# Patient Record
Sex: MALE | Race: WHITE | NOT HISPANIC OR LATINO | Employment: OTHER | URBAN - METROPOLITAN AREA
[De-identification: names, ages, dates, MRNs, and addresses within clinical notes are randomized per-mention and may not be internally consistent; named-entity substitution may affect disease eponyms.]

---

## 2023-02-01 PROBLEM — R05.3 CHRONIC COUGH: Status: ACTIVE | Noted: 2023-02-01

## 2023-02-01 PROBLEM — E78.5 HYPERLIPIDEMIA: Status: ACTIVE | Noted: 2023-02-01

## 2023-02-01 PROBLEM — R53.83 FATIGUE: Status: ACTIVE | Noted: 2023-02-01

## 2023-02-01 PROBLEM — D64.9 ANEMIA: Status: ACTIVE | Noted: 2023-02-01

## 2023-02-01 PROBLEM — E34.9 TESTOSTERONE DEFICIENCY: Status: ACTIVE | Noted: 2023-02-01

## 2023-02-01 PROBLEM — J32.9 SINUSITIS: Status: ACTIVE | Noted: 2023-02-01

## 2023-02-01 PROBLEM — J06.9 URI (UPPER RESPIRATORY INFECTION): Status: ACTIVE | Noted: 2023-02-01

## 2023-02-01 PROBLEM — R09.81 NASAL CONGESTION: Status: ACTIVE | Noted: 2023-02-01

## 2023-02-01 PROBLEM — J04.0 LARYNGITIS: Status: ACTIVE | Noted: 2023-02-01

## 2023-02-01 PROBLEM — J30.9 ALLERGIC RHINITIS: Status: ACTIVE | Noted: 2023-02-01

## 2023-02-01 PROBLEM — H69.90 ETD (EUSTACHIAN TUBE DYSFUNCTION): Status: ACTIVE | Noted: 2023-02-01

## 2023-02-01 PROBLEM — J40 BRONCHITIS: Status: ACTIVE | Noted: 2023-02-01

## 2023-02-01 PROBLEM — I10 HTN (HYPERTENSION): Status: ACTIVE | Noted: 2023-02-01

## 2023-02-01 PROBLEM — I25.10 CORONARY ARTERY DISEASE: Status: ACTIVE | Noted: 2023-02-01

## 2023-02-01 PROBLEM — R19.7 DIARRHEA: Status: ACTIVE | Noted: 2023-02-01

## 2023-02-01 PROBLEM — R03.0 ELEVATED BLOOD PRESSURE READING: Status: ACTIVE | Noted: 2023-02-01

## 2023-02-01 PROBLEM — J06.9 ACUTE URI: Status: ACTIVE | Noted: 2023-02-01

## 2023-02-01 PROBLEM — R05.8 ALLERGIC COUGH: Status: ACTIVE | Noted: 2023-02-01

## 2023-02-01 PROBLEM — E66.3 OVERWEIGHT (BMI 25.0-29.9): Status: ACTIVE | Noted: 2023-02-01

## 2023-02-01 PROBLEM — L91.8 CUTANEOUS SKIN TAGS: Status: ACTIVE | Noted: 2023-02-01

## 2023-02-01 PROBLEM — J40 TRACHEOBRONCHITIS: Status: ACTIVE | Noted: 2023-02-01

## 2023-02-01 PROBLEM — E83.51 HYPOCALCEMIA: Status: ACTIVE | Noted: 2023-02-01

## 2023-02-01 PROBLEM — H65.01 RIGHT ACUTE SEROUS OTITIS MEDIA: Status: ACTIVE | Noted: 2023-02-01

## 2023-02-01 PROBLEM — R79.89 LOW VITAMIN D LEVEL: Status: ACTIVE | Noted: 2023-02-01

## 2023-02-01 PROBLEM — B34.9 VIRAL SYNDROME: Status: ACTIVE | Noted: 2023-02-01

## 2023-02-01 PROBLEM — M62.838 MUSCLE SPASMS OF NECK: Status: ACTIVE | Noted: 2023-02-01

## 2023-02-01 PROBLEM — K51.90 ULCERATIVE COLITIS (MULTI): Status: ACTIVE | Noted: 2023-02-01

## 2023-02-01 PROBLEM — E78.5 DYSLIPIDEMIA: Status: ACTIVE | Noted: 2023-02-01

## 2023-02-01 PROBLEM — R07.89 CHEST TIGHTNESS: Status: ACTIVE | Noted: 2023-02-01

## 2023-02-01 PROBLEM — R25.2 CRAMP AND SPASM: Status: ACTIVE | Noted: 2023-02-01

## 2023-02-01 RX ORDER — TESTOSTERONE CYPIONATE 200 MG/ML
INJECTION, SOLUTION INTRAMUSCULAR
COMMUNITY
Start: 2021-07-20

## 2023-02-01 RX ORDER — VALSARTAN 160 MG/1
160 TABLET ORAL DAILY
COMMUNITY
End: 2023-11-21 | Stop reason: SDUPTHER

## 2023-02-01 RX ORDER — CHLORZOXAZONE 500 MG/1
1 TABLET ORAL 4 TIMES DAILY PRN
COMMUNITY
Start: 2017-07-10

## 2023-02-01 RX ORDER — ROSUVASTATIN CALCIUM 10 MG/1
1 TABLET, COATED ORAL DAILY
COMMUNITY
Start: 2018-12-31 | End: 2023-06-05

## 2023-03-15 ENCOUNTER — OFFICE VISIT (OUTPATIENT)
Dept: PRIMARY CARE | Facility: CLINIC | Age: 63
End: 2023-03-15
Payer: COMMERCIAL

## 2023-03-15 VITALS
WEIGHT: 227 LBS | DIASTOLIC BLOOD PRESSURE: 81 MMHG | HEIGHT: 70 IN | TEMPERATURE: 98.2 F | SYSTOLIC BLOOD PRESSURE: 128 MMHG | BODY MASS INDEX: 32.5 KG/M2 | OXYGEN SATURATION: 98 % | HEART RATE: 63 BPM

## 2023-03-15 DIAGNOSIS — M62.838 MUSCLE SPASMS OF NECK: ICD-10-CM

## 2023-03-15 DIAGNOSIS — K51.00 ULCERATIVE PANCOLITIS WITHOUT COMPLICATION (MULTI): ICD-10-CM

## 2023-03-15 DIAGNOSIS — R09.89 LABILE HYPERTENSION: Primary | ICD-10-CM

## 2023-03-15 DIAGNOSIS — Z51.81 MEDICATION MONITORING ENCOUNTER: ICD-10-CM

## 2023-03-15 DIAGNOSIS — E34.9 TESTOSTERONE DEFICIENCY: ICD-10-CM

## 2023-03-15 DIAGNOSIS — E78.5 DYSLIPIDEMIA: ICD-10-CM

## 2023-03-15 DIAGNOSIS — K52.9 COLITIS: ICD-10-CM

## 2023-03-15 DIAGNOSIS — I10 PRIMARY HYPERTENSION: ICD-10-CM

## 2023-03-15 PROCEDURE — 99213 OFFICE O/P EST LOW 20 MIN: CPT | Performed by: FAMILY MEDICINE

## 2023-03-15 PROCEDURE — 3074F SYST BP LT 130 MM HG: CPT | Performed by: FAMILY MEDICINE

## 2023-03-15 PROCEDURE — 3079F DIAST BP 80-89 MM HG: CPT | Performed by: FAMILY MEDICINE

## 2023-03-15 PROCEDURE — 1036F TOBACCO NON-USER: CPT | Performed by: FAMILY MEDICINE

## 2023-03-15 ASSESSMENT — PAIN SCALES - GENERAL: PAINLEVEL: 0-NO PAIN

## 2023-03-15 NOTE — PROGRESS NOTES
Subjective   Bradley Patterson is a 62 y.o. male who presents for Follow-up (BP check).  HPI      Objective     Physical Exam  Vitals and nursing note reviewed.   Constitutional:       Appearance: Normal appearance. He is obese.   HENT:      Head: Normocephalic.      Right Ear: Tympanic membrane and external ear normal.      Left Ear: Tympanic membrane and external ear normal.      Nose: Nose normal.      Mouth/Throat:      Mouth: Mucous membranes are moist.   Eyes:      Extraocular Movements: Extraocular movements intact.      Conjunctiva/sclera: Conjunctivae normal.      Pupils: Pupils are equal, round, and reactive to light.      Comments: Denies any visual disturbances.  Does get his eyes checked periodically.   Neck:      Comments:   Has occasional neck pain and spasm from an old injury.  Occasionally has to take muscle relaxants.  He does do stretching exercises to help improve the range of motion.  Cardiovascular:      Rate and Rhythm: Normal rate and regular rhythm.      Pulses: Normal pulses.      Heart sounds: Normal heart sounds. No murmur heard.     No friction rub.      Comments: Heart rhythm is stable S1 and S2 noted.  No ectopics no murmurs.  Pulmonary:      Effort: Pulmonary effort is normal.      Comments:  lungs are clear to auscultation.  Abdominal:      General: Bowel sounds are normal.      Palpations: Abdomen is soft.      Comments: History of ulcerative colitis and does follow with the [facility].  Occasionally gets cramping and some diarrhea but has been relatively stable.   Genitourinary:     Rectum: Guaiac result negative.   Musculoskeletal:         General: Normal range of motion.      Cervical back: Normal range of motion. Rigidity and tenderness present.      Comments: Patient does have some lower back spasm and DJD.  He does try to exercise and stretch.   Skin:     General: Skin is warm.   Neurological:      General: No focal deficit present.      Mental Status: He is alert and  oriented to person, place, and time. Mental status is at baseline.   Psychiatric:         Mood and Affect: Mood normal.         Behavior: Behavior normal.         Thought Content: Thought content normal.         Judgment: Judgment normal.      Comments: Normal mood and affect.     PLAN;   patient was evaluated for mainly his hypertension and review of medication.  He has no new blood pressure pill valsartan seems to be controlling his pressure very well when he came into the office he was 128/81 and when I rechecked it it was 128/78.  Patient is trying to watch his diet and avoid salt and lose a few pounds.  He also is trying to exercise.  He goes to the [facility] for evaluation of his colitis and that is currently stable. Patient also sees urology for his testosterone deficiency.  Patient is otherwise stable we will follow-up in 4 months or sooner as needed.             Immanuel Aleman, DO

## 2023-03-15 NOTE — PROGRESS NOTES
Subjective   Bradley Patterson is a 62 y.o. male who presents for Follow-up (BP check).  HPI patient is a 62-year-old male who is being reevaluated mainly for blood pressure.  Patient was started on a new blood pressure medicine about 2 months ago and it seems to be controlling his blood pressure much better.  He does try to monitor it at home and then he came in today for recheck.  He otherwise is very healthy and recently retired.  He does have some issues with ulcerative colitis and does go to the [facility] gastroenterology for that problem.      Objective ROS10 systems were reviewed and the information is included in the HPI and no additional review of systems is indicated.    Physical Exam    Problem List Items Addressed This Visit    None           Immanuel Aleman, DO

## 2023-06-05 DIAGNOSIS — E78.5 HYPERLIPIDEMIA, UNSPECIFIED: ICD-10-CM

## 2023-06-05 RX ORDER — ROSUVASTATIN CALCIUM 10 MG/1
TABLET, COATED ORAL
Qty: 90 TABLET | Refills: 4 | Status: SHIPPED | OUTPATIENT
Start: 2023-06-05 | End: 2024-04-22 | Stop reason: SDUPTHER

## 2023-10-03 ENCOUNTER — OFFICE VISIT (OUTPATIENT)
Dept: PRIMARY CARE | Facility: CLINIC | Age: 63
End: 2023-10-03
Payer: COMMERCIAL

## 2023-10-03 VITALS
DIASTOLIC BLOOD PRESSURE: 82 MMHG | SYSTOLIC BLOOD PRESSURE: 142 MMHG | HEIGHT: 70 IN | HEART RATE: 68 BPM | OXYGEN SATURATION: 97 % | WEIGHT: 220 LBS | TEMPERATURE: 98 F | BODY MASS INDEX: 31.5 KG/M2

## 2023-10-03 DIAGNOSIS — K21.00 GASTROESOPHAGEAL REFLUX DISEASE WITH ESOPHAGITIS WITHOUT HEMORRHAGE: ICD-10-CM

## 2023-10-03 DIAGNOSIS — R09.89 LABILE HYPERTENSION: ICD-10-CM

## 2023-10-03 DIAGNOSIS — Z00.00 PHYSICAL EXAM, ANNUAL: Primary | ICD-10-CM

## 2023-10-03 DIAGNOSIS — Z23 NEEDS FLU SHOT: ICD-10-CM

## 2023-10-03 DIAGNOSIS — R79.89 LOW VITAMIN D LEVEL: ICD-10-CM

## 2023-10-03 DIAGNOSIS — E78.5 DYSLIPIDEMIA: ICD-10-CM

## 2023-10-03 DIAGNOSIS — K52.9 COLITIS: ICD-10-CM

## 2023-10-03 DIAGNOSIS — K51.811 OTHER ULCERATIVE COLITIS WITH RECTAL BLEEDING (MULTI): ICD-10-CM

## 2023-10-03 LAB
ALBUMIN SERPL BCP-MCNC: 4.7 G/DL (ref 3.4–5)
ALP SERPL-CCNC: 49 U/L (ref 33–136)
ALT SERPL W P-5'-P-CCNC: 28 U/L (ref 10–52)
ANION GAP SERPL CALC-SCNC: 13 MMOL/L (ref 10–20)
APPEARANCE UR: CLEAR
AST SERPL W P-5'-P-CCNC: 20 U/L (ref 9–39)
BILIRUB SERPL-MCNC: 1.2 MG/DL (ref 0–1.2)
BILIRUB UR QL STRIP: NEGATIVE
BUN SERPL-MCNC: 15 MG/DL (ref 6–23)
CALCIUM SERPL-MCNC: 9.8 MG/DL (ref 8.6–10.6)
CHLORIDE SERPL-SCNC: 103 MMOL/L (ref 98–107)
CHOLEST SERPL-MCNC: 174 MG/DL (ref 0–199)
CHOLESTEROL/HDL RATIO: 3.2
CO2 SERPL-SCNC: 28 MMOL/L (ref 21–32)
COLOR UR: YELLOW
CREAT SERPL-MCNC: 0.83 MG/DL (ref 0.5–1.3)
ERYTHROCYTE [DISTWIDTH] IN BLOOD BY AUTOMATED COUNT: 13.7 % (ref 11.5–14.5)
GFR SERPL CREATININE-BSD FRML MDRD: >90 ML/MIN/1.73M*2
GLUCOSE SERPL-MCNC: 86 MG/DL (ref 74–99)
GLUCOSE UR STRIP-MCNC: NEGATIVE MG/DL
HCT VFR BLD AUTO: 44.8 % (ref 41–52)
HDLC SERPL-MCNC: 54.1 MG/DL
HGB BLD-MCNC: 15.2 G/DL (ref 13.5–17.5)
HGB UR QL STRIP: NEGATIVE
KETONES UR STRIP-MCNC: NEGATIVE MG/DL
LDLC SERPL CALC-MCNC: 94 MG/DL (ref 140–190)
LEUKOCYTE ESTERASE UR QL STRIP: NEGATIVE
MCH RBC QN AUTO: 32 PG (ref 26–34)
MCHC RBC AUTO-ENTMCNC: 33.9 G/DL (ref 32–36)
MCV RBC AUTO: 94 FL (ref 80–100)
NITRITE UR QL STRIP: NEGATIVE
NON HDL CHOLESTEROL: 120 MG/DL (ref 0–149)
NRBC BLD-RTO: 0 /100 WBCS (ref 0–0)
PH UR STRIP: 6 [PH]
PLATELET # BLD AUTO: 202 X10*3/UL (ref 150–450)
PMV BLD AUTO: 9.8 FL (ref 7.5–11.5)
POTASSIUM SERPL-SCNC: 4.4 MMOL/L (ref 3.5–5.3)
PROT SERPL-MCNC: 6.8 G/DL (ref 6.4–8.2)
PROT UR STRIP-MCNC: NEGATIVE MG/DL
RBC # BLD AUTO: 4.75 X10*6/UL (ref 4.5–5.9)
SODIUM SERPL-SCNC: 140 MMOL/L (ref 136–145)
SP GR UR STRIP.AUTO: 1.02
TRIGL SERPL-MCNC: 129 MG/DL (ref 0–149)
TSH SERPL-ACNC: 1.53 MIU/L (ref 0.44–3.98)
UROBILINOGEN UR STRIP-ACNC: 0.2 E.U./DL
VLDL: 26 MG/DL (ref 0–40)
WBC # BLD AUTO: 6.8 X10*3/UL (ref 4.4–11.3)

## 2023-10-03 PROCEDURE — 90471 IMMUNIZATION ADMIN: CPT | Performed by: FAMILY MEDICINE

## 2023-10-03 PROCEDURE — 3077F SYST BP >= 140 MM HG: CPT | Performed by: FAMILY MEDICINE

## 2023-10-03 PROCEDURE — 99396 PREV VISIT EST AGE 40-64: CPT | Performed by: FAMILY MEDICINE

## 2023-10-03 PROCEDURE — 93000 ELECTROCARDIOGRAM COMPLETE: CPT | Performed by: FAMILY MEDICINE

## 2023-10-03 PROCEDURE — 3079F DIAST BP 80-89 MM HG: CPT | Performed by: FAMILY MEDICINE

## 2023-10-03 PROCEDURE — 36415 COLL VENOUS BLD VENIPUNCTURE: CPT

## 2023-10-03 PROCEDURE — 81003 URINALYSIS AUTO W/O SCOPE: CPT | Performed by: FAMILY MEDICINE

## 2023-10-03 PROCEDURE — 90682 RIV4 VACC RECOMBINANT DNA IM: CPT | Performed by: FAMILY MEDICINE

## 2023-10-03 PROCEDURE — 1036F TOBACCO NON-USER: CPT | Performed by: FAMILY MEDICINE

## 2023-10-03 ASSESSMENT — ENCOUNTER SYMPTOMS: DEPRESSION: 0

## 2023-10-03 NOTE — PROGRESS NOTES
Subjective   Bradley Patterson is a 63 y.o. male who presents for Annual Exam (Pt is here for yearly physical).     HPI  : Patient is a 63-year-old male who is in today for his yearly physical.  Patient will have EKG, urinalysis and blood work.  He states he is feeling fine since he retired from work and tries to stay busy and active daily.  Patient also would like a flu shot today.  He states that he takes his blood pressure medication and his blood pressure has been stable, he also states his colitis has been stable.  Patient does still follow with urology for his testosterone deficiency.      Objective   : ROS :10 systems were reviewed and the information is included in the HPI and no additional review of systems is indicated.    Physical Exam  Vitals and nursing note reviewed.   Constitutional:       Appearance: Normal appearance.      Comments: Patient is alert and oriented x3.   No acute distress   HENT:      Head: Normocephalic.      Right Ear: Tympanic membrane and external ear normal.      Left Ear: Tympanic membrane and external ear normal.      Ears:      Comments: Ears are patent bilaterally and TMs are clear.     Nose: Nose normal.      Mouth/Throat:      Mouth: Mucous membranes are moist.      Pharynx: Oropharynx is clear.      Comments: Mouth is moist, tongue is midline.  No posterior pharyngeal erythema.  Eyes:      Extraocular Movements: Extraocular movements intact.      Conjunctiva/sclera: Conjunctivae normal.      Pupils: Pupils are equal, round, and reactive to light.      Comments: No visual disturbance, does have her eyes examined once a year.   Neck:      Comments: Occasionally has neck pain and spasm from an old injury.  Does have muscle relaxers at home if he needs them.  No carotid bruits, no thyromegaly, no cervical adenopathy.    Cardiovascular:      Rate and Rhythm: Normal rate and regular rhythm.      Pulses: Normal pulses.      Heart sounds: Normal heart sounds.      Comments: Patient  denies chest pain and no palpitations.  Heart rhythm is stable S1 and S2 are noted, no ectopics.  Pulmonary:      Effort: Pulmonary effort is normal.      Comments: Patient does get an intermittent cough which could be allergies.  He also has more sputum production and congestion in the morning.  Today his lungs are clear to auscultation.  Abdominal:      General: Bowel sounds are normal.      Palpations: Abdomen is soft.      Comments: Patient states his ulcerative colitis has been stable.  He does follow with gastroenterology at the [facility].  He does have frequent colonoscopies and he denies any abdominal pain at this time.  Positive bowel sounds x4.  Abdomen is soft and nontender to palpation.   Genitourinary:     Comments: Patient does follow with urology and does receive testosterone every 3 weeks.  Patient denies dysuria, no hematuria, no nocturia, denies flank pain.  Musculoskeletal:      Comments: Recently saw orthopedics and had a cortisone shot in his right shoulder and right knee.  Currently he is feeling better but does have age-related arthritis in the joints.  Mild restriction of motion cervical and lumbar spines due to   DJD , and muscle spasm.   Skin:     General: Skin is warm.      Comments: There is no bruising, no erythema, no skin lesions noted, no rashes.   Neurological:      General: No focal deficit present.      Mental Status: He is alert and oriented to person, place, and time. Mental status is at baseline.      Comments: No focal neurosensory deficits are noted.  Patient denies any peripheral neuropathy.  Coordination and gait are stable.  Normal muscle strength upper and lower extremities.   Psychiatric:         Mood and Affect: Mood normal.         Behavior: Behavior normal.         Thought Content: Thought content normal.         Judgment: Judgment normal.      Comments: Patient has normal mood and affect.  Thought content and judgment are stable.  No signs of vascular dementia.   Behavior is normal.     PLAN : Patient is a 63-year-old male who is in today for a physical exam.  Patient's urinalysis showed a pH of 6.0, specific gravity 1.020, negative leukocytes, negative protein, negative blood, negative glucose.  EKG shows sinus rhythm at a rate of 61, developed old septal infarct, ECG has no acute changes and is unchanged from last year.  Patient had his blood work drawn and will be notified of the results in 4 days, further recommendations will be made after review of his blood results.  He also received the Flublok , flu shot today without problems.   Patient's exam is stable and he will follow-up as needed.    Problem List Items Addressed This Visit    None  Visit Diagnoses       Needs flu shot    -  Primary    Physical exam, annual        Relevant Orders    CBC    Comprehensive Metabolic Panel    Lipid Panel    Thyroid Stimulating Hormone    ECG 12 Lead    POCT UA (Automated) docked device    Gastroesophageal reflux disease with esophagitis without hemorrhage                     Immanuel Aleman, DO

## 2023-10-07 NOTE — RESULT ENCOUNTER NOTE
Cholesterol is normal at 174       triglycerides are normal at 129      thyroid function is normal    red and white blood cell counts are normal     blood sugar,   kidney and liver function are normal     blood work all looks normal and stable         keep up the good work

## 2023-11-21 DIAGNOSIS — R09.89 LABILE HYPERTENSION: ICD-10-CM

## 2023-11-22 RX ORDER — VALSARTAN 160 MG/1
160 TABLET ORAL DAILY
Qty: 90 TABLET | Refills: 3 | Status: SHIPPED | OUTPATIENT
Start: 2023-11-22 | End: 2024-04-22 | Stop reason: SDUPTHER

## 2024-01-08 ENCOUNTER — OFFICE VISIT (OUTPATIENT)
Dept: PRIMARY CARE | Facility: CLINIC | Age: 64
End: 2024-01-08
Payer: COMMERCIAL

## 2024-01-08 VITALS
BODY MASS INDEX: 32.35 KG/M2 | OXYGEN SATURATION: 98 % | RESPIRATION RATE: 16 BRPM | WEIGHT: 226 LBS | HEART RATE: 65 BPM | HEIGHT: 70 IN | DIASTOLIC BLOOD PRESSURE: 84 MMHG | TEMPERATURE: 98.1 F | SYSTOLIC BLOOD PRESSURE: 140 MMHG

## 2024-01-08 DIAGNOSIS — R25.2 CRAMP AND SPASM: ICD-10-CM

## 2024-01-08 DIAGNOSIS — R09.89 LABILE HYPERTENSION: ICD-10-CM

## 2024-01-08 DIAGNOSIS — K90.9 DIARRHEA DUE TO MALABSORPTION (HHS-HCC): ICD-10-CM

## 2024-01-08 DIAGNOSIS — K51.80 OTHER ULCERATIVE COLITIS WITHOUT COMPLICATION (MULTI): Primary | ICD-10-CM

## 2024-01-08 DIAGNOSIS — R19.7 DIARRHEA DUE TO MALABSORPTION (HHS-HCC): ICD-10-CM

## 2024-01-08 DIAGNOSIS — R05.8 ALLERGIC COUGH: ICD-10-CM

## 2024-01-08 DIAGNOSIS — R05.3 CHRONIC COUGH: ICD-10-CM

## 2024-01-08 PROCEDURE — 3079F DIAST BP 80-89 MM HG: CPT | Performed by: FAMILY MEDICINE

## 2024-01-08 PROCEDURE — 3077F SYST BP >= 140 MM HG: CPT | Performed by: FAMILY MEDICINE

## 2024-01-08 PROCEDURE — 1036F TOBACCO NON-USER: CPT | Performed by: FAMILY MEDICINE

## 2024-01-08 PROCEDURE — 99214 OFFICE O/P EST MOD 30 MIN: CPT | Performed by: FAMILY MEDICINE

## 2024-01-08 RX ORDER — MESALAMINE 4 G/60 ML
4 KIT RECTAL DAILY
Qty: 7 KIT | Refills: 3 | Status: SHIPPED | OUTPATIENT
Start: 2024-01-08 | End: 2024-04-22 | Stop reason: WASHOUT

## 2024-01-08 RX ORDER — PREDNISONE 10 MG/1
10 TABLET ORAL 2 TIMES DAILY
Qty: 14 TABLET | Refills: 1 | Status: SHIPPED | OUTPATIENT
Start: 2024-01-08 | End: 2024-01-22

## 2024-01-08 RX ORDER — PREDNISONE 10 MG/1
10 TABLET ORAL 2 TIMES DAILY
Qty: 14 TABLET | Refills: 3 | Status: SHIPPED | OUTPATIENT
Start: 2024-01-08 | End: 2024-01-08 | Stop reason: ALTCHOICE

## 2024-01-08 ASSESSMENT — PATIENT HEALTH QUESTIONNAIRE - PHQ9
1. LITTLE INTEREST OR PLEASURE IN DOING THINGS: NOT AT ALL
SUM OF ALL RESPONSES TO PHQ9 QUESTIONS 1 AND 2: 0
1. LITTLE INTEREST OR PLEASURE IN DOING THINGS: NOT AT ALL
2. FEELING DOWN, DEPRESSED OR HOPELESS: NOT AT ALL
SUM OF ALL RESPONSES TO PHQ9 QUESTIONS 1 AND 2: 0
2. FEELING DOWN, DEPRESSED OR HOPELESS: NOT AT ALL

## 2024-01-08 ASSESSMENT — PAIN SCALES - GENERAL: PAINLEVEL: 0-NO PAIN

## 2024-01-08 NOTE — PROGRESS NOTES
Subjective   Bradley Patterson is a 63 y.o. male who presents for Sick Visit (Patient here with complaint of cough).    HPI  : Patient is a 63-year-old male who has a dry type allergic cough.  He states that it has been lingering through the holidays and his ulcerative colitis has also been giving him some problems.  He takes medicine from gastroenterology for ulcerative colitis and he also has some Rowasa enemas that he uses.  Patient will be evaluated today and he may need a short course of prednisone for his allergic type cough and also his flareup of ulcerative colitis.      Objective  : ROS : 10 systems were reviewed and the information is included in the HPI and no additional review of systems is indicated.    Physical Exam  Vitals and nursing note reviewed.   Constitutional:       Appearance: Normal appearance.      Comments: Patient is alert and oriented x3.   No acute distress   HENT:      Head: Normocephalic.      Right Ear: Tympanic membrane and external ear normal.      Left Ear: Tympanic membrane and external ear normal.      Ears:      Comments: Ears are patent bilaterally and TMs are clear.     Nose: Nose normal.      Mouth/Throat:      Mouth: Mucous membranes are moist.      Pharynx: Oropharynx is clear.      Comments: Mouth is moist, tongue is midline.  No posterior pharyngeal erythema.  Eyes:      Extraocular Movements: Extraocular movements intact.      Conjunctiva/sclera: Conjunctivae normal.      Pupils: Pupils are equal, round, and reactive to light.      Comments: No visual disturbance, does have his  eyes examined once a year.   Neck:      Comments: No carotid bruits, no thyromegaly, no cervical adenopathy.  Occasional neck spasm and restriction of motion secondary to stress and tension.  Cardiovascular:      Rate and Rhythm: Normal rate and regular rhythm.      Pulses: Normal pulses.      Heart sounds: Normal heart sounds.      Comments: Patient denies chest pain and no palpitations.  Heart  rhythm is stable S1 and S2 are noted, no ectopics.  Pulmonary:      Effort: Pulmonary effort is normal.      Breath sounds: Rhonchi present.      Comments: Patient develops an occasional cough with upper respiratory congestion.  He also thinks it may be an allergic type cough from environmental allergens.  No wheezing is noted just a few scattered rhonchi.  Abdominal:      General: Bowel sounds are normal.      Palpations: Abdomen is soft.      Comments: Patient has some localizable lower abdominal tenderness from his ulcerative colitis and does have some episodes of diarrhea which seem to be improving using the Rowasa enema.  Patient denies any bleeding at this time. It Is more of just lower abdominal cramping.   Genitourinary:     Comments: Patient denies dysuria, no hematuria, denies flank pain.  Occasional nocturia.  Musculoskeletal:         General: Normal range of motion.      Cervical back: Normal range of motion.      Comments: Age-related arthritis in the joints.  Mild restriction of motion cervical and lumbar spines due to mild arthritis and muscle spasm.   Skin:     General: Skin is warm and dry.      Comments: There is no bruising, no erythema, no skin lesions noted, no rashes.   Neurological:      General: No focal deficit present.      Mental Status: He is alert and oriented to person, place, and time. Mental status is at baseline.      Comments: No focal neurosensory deficits are noted.  Patient denies any peripheral neuropathy.  Coordination and gait are stable.  Normal muscle strength upper and lower extremities.   Psychiatric:         Mood and Affect: Mood normal.         Behavior: Behavior normal.         Thought Content: Thought content normal.         Judgment: Judgment normal.      Comments: Patient has normal mood and affect.  Thought content and judgment are stable.  No signs of vascular dementia.  Behavior is normal.     PLAN : Patient is a 63-year-old male who has history of ulcerative  colitis and recently had a flareup over the holidays.  He is starting to feel better and use the Rowasa enemas along with his oral medication since that does seem to help.  Patient was also given a short course of prednisone which will also help his cough.  He does not need any antibiotics does not care to take antibiotics since it makes his diarrhea worse.  He also will try some probiotics and watch his diet closely.  He will follow-up for blood work if there is no improvement.    Problem List Items Addressed This Visit       Allergic cough    Chronic cough    Cramp and spasm - Primary    Diarrhea    Labile hypertension    Ulcerative colitis (CMS/HCC)    Relevant Medications    mesalamine with cleansing wipe (Rowasa) 4 gram/60 mL enema    predniSONE (Deltasone) 10 mg tablet            Immanuel Aleman,

## 2024-04-10 PROBLEM — I10 HTN (HYPERTENSION): Status: ACTIVE | Noted: 2024-04-10

## 2024-04-18 ENCOUNTER — OFFICE VISIT (OUTPATIENT)
Dept: PRIMARY CARE | Facility: CLINIC | Age: 64
End: 2024-04-18
Payer: COMMERCIAL

## 2024-04-18 VITALS
WEIGHT: 226 LBS | SYSTOLIC BLOOD PRESSURE: 132 MMHG | HEIGHT: 70 IN | BODY MASS INDEX: 32.35 KG/M2 | DIASTOLIC BLOOD PRESSURE: 80 MMHG | OXYGEN SATURATION: 97 % | HEART RATE: 77 BPM

## 2024-04-18 DIAGNOSIS — Z12.5 SCREENING PSA (PROSTATE SPECIFIC ANTIGEN): ICD-10-CM

## 2024-04-18 DIAGNOSIS — I10 PRIMARY HYPERTENSION: ICD-10-CM

## 2024-04-18 DIAGNOSIS — M62.838 MUSCLE SPASMS OF NECK: ICD-10-CM

## 2024-04-18 DIAGNOSIS — H69.91 DYSFUNCTION OF RIGHT EUSTACHIAN TUBE: ICD-10-CM

## 2024-04-18 DIAGNOSIS — E78.5 DYSLIPIDEMIA: ICD-10-CM

## 2024-04-18 DIAGNOSIS — K52.9 COLITIS: ICD-10-CM

## 2024-04-18 DIAGNOSIS — R53.81 MALAISE AND FATIGUE: ICD-10-CM

## 2024-04-18 DIAGNOSIS — R42 DIZZINESS: Primary | ICD-10-CM

## 2024-04-18 DIAGNOSIS — R09.89 LABILE HYPERTENSION: ICD-10-CM

## 2024-04-18 DIAGNOSIS — R79.89 LOW VITAMIN D LEVEL: ICD-10-CM

## 2024-04-18 DIAGNOSIS — K51.811 OTHER ULCERATIVE COLITIS WITH RECTAL BLEEDING (MULTI): ICD-10-CM

## 2024-04-18 DIAGNOSIS — J30.89 NON-SEASONAL ALLERGIC RHINITIS, UNSPECIFIED TRIGGER: ICD-10-CM

## 2024-04-18 DIAGNOSIS — M77.8 SHOULDER CAPSULITIS, RIGHT: ICD-10-CM

## 2024-04-18 DIAGNOSIS — R53.83 MALAISE AND FATIGUE: ICD-10-CM

## 2024-04-18 LAB
25(OH)D3 SERPL-MCNC: 47 NG/ML (ref 30–100)
ALBUMIN SERPL BCP-MCNC: 4.1 G/DL (ref 3.4–5)
ALP SERPL-CCNC: 45 U/L (ref 33–136)
ALT SERPL W P-5'-P-CCNC: 21 U/L (ref 10–52)
ANION GAP SERPL CALC-SCNC: 15 MMOL/L (ref 10–20)
AST SERPL W P-5'-P-CCNC: 31 U/L (ref 9–39)
BILIRUB SERPL-MCNC: 0.6 MG/DL (ref 0–1.2)
BUN SERPL-MCNC: 15 MG/DL (ref 6–23)
CALCIUM SERPL-MCNC: 8.5 MG/DL (ref 8.6–10.6)
CHLORIDE SERPL-SCNC: 104 MMOL/L (ref 98–107)
CHOLEST SERPL-MCNC: 135 MG/DL (ref 0–199)
CHOLESTEROL/HDL RATIO: 2.9
CO2 SERPL-SCNC: 23 MMOL/L (ref 21–32)
CREAT SERPL-MCNC: 0.86 MG/DL (ref 0.5–1.3)
EGFRCR SERPLBLD CKD-EPI 2021: >90 ML/MIN/1.73M*2
ERYTHROCYTE [DISTWIDTH] IN BLOOD BY AUTOMATED COUNT: 12.6 % (ref 11.5–14.5)
GLUCOSE SERPL-MCNC: 64 MG/DL (ref 74–99)
HCT VFR BLD AUTO: 32.2 % (ref 41–52)
HDLC SERPL-MCNC: 45.9 MG/DL
HGB BLD-MCNC: 10.7 G/DL (ref 13.5–17.5)
LDLC SERPL CALC-MCNC: 75 MG/DL
MCH RBC QN AUTO: 29.9 PG (ref 26–34)
MCHC RBC AUTO-ENTMCNC: 33.2 G/DL (ref 32–36)
MCV RBC AUTO: 90 FL (ref 80–100)
NON HDL CHOLESTEROL: 89 MG/DL (ref 0–149)
NRBC BLD-RTO: 0 /100 WBCS (ref 0–0)
PLATELET # BLD AUTO: 224 X10*3/UL (ref 150–450)
POTASSIUM SERPL-SCNC: 4.1 MMOL/L (ref 3.5–5.3)
PROT SERPL-MCNC: 5.8 G/DL (ref 6.4–8.2)
PSA SERPL-MCNC: 0.54 NG/ML
RBC # BLD AUTO: 3.58 X10*6/UL (ref 4.5–5.9)
SODIUM SERPL-SCNC: 138 MMOL/L (ref 136–145)
TRIGL SERPL-MCNC: 71 MG/DL (ref 0–149)
TSH SERPL-ACNC: 1 MIU/L (ref 0.44–3.98)
VLDL: 14 MG/DL (ref 0–40)
WBC # BLD AUTO: 5 X10*3/UL (ref 4.4–11.3)

## 2024-04-18 PROCEDURE — 80061 LIPID PANEL: CPT

## 2024-04-18 PROCEDURE — 1036F TOBACCO NON-USER: CPT | Performed by: FAMILY MEDICINE

## 2024-04-18 PROCEDURE — 85027 COMPLETE CBC AUTOMATED: CPT

## 2024-04-18 PROCEDURE — 36415 COLL VENOUS BLD VENIPUNCTURE: CPT

## 2024-04-18 PROCEDURE — 3075F SYST BP GE 130 - 139MM HG: CPT | Performed by: FAMILY MEDICINE

## 2024-04-18 PROCEDURE — 3079F DIAST BP 80-89 MM HG: CPT | Performed by: FAMILY MEDICINE

## 2024-04-18 PROCEDURE — 84153 ASSAY OF PSA TOTAL: CPT

## 2024-04-18 PROCEDURE — 84443 ASSAY THYROID STIM HORMONE: CPT

## 2024-04-18 PROCEDURE — 82306 VITAMIN D 25 HYDROXY: CPT

## 2024-04-18 PROCEDURE — 80053 COMPREHEN METABOLIC PANEL: CPT

## 2024-04-18 PROCEDURE — 99214 OFFICE O/P EST MOD 30 MIN: CPT | Performed by: FAMILY MEDICINE

## 2024-04-18 ASSESSMENT — PATIENT HEALTH QUESTIONNAIRE - PHQ9
2. FEELING DOWN, DEPRESSED OR HOPELESS: NOT AT ALL
SUM OF ALL RESPONSES TO PHQ9 QUESTIONS 1 AND 2: 0
1. LITTLE INTEREST OR PLEASURE IN DOING THINGS: NOT AT ALL
2. FEELING DOWN, DEPRESSED OR HOPELESS: NOT AT ALL
SUM OF ALL RESPONSES TO PHQ9 QUESTIONS 1 AND 2: 0
1. LITTLE INTEREST OR PLEASURE IN DOING THINGS: NOT AT ALL

## 2024-04-18 NOTE — PROGRESS NOTES
Subjective   Bradley Patterson is a 63 y.o. male who presents for Earache (Last few weeks ears have been full/plugged up /He has also been slightly lightheaded and dizzy /).   HPI : Patient is a 63-year-old male who has been feeling some lightheadedness and dizziness along with some ear pressure and sinus congestion.  He would like his ears checked to make sure there is no infection.  He also needs his blood work rechecked today since he has a history of ulcerative colitis and does take medication from gastroenterology.  He also is on rosuvastatin and we will be checking his lipids.  Today his blood pressure is stable and he is on medication for hypertension.    Objective   :  ROS :10 systems were reviewed and the information is included in the HPI and no additional review of systems is indicated.    Physical Exam  Vitals and nursing note reviewed.   Constitutional:       Appearance: Normal appearance.      Comments: Patient is alert and oriented x3.   No acute distress   HENT:      Head: Normocephalic.      Right Ear: Tympanic membrane and external ear normal.      Left Ear: Tympanic membrane and external ear normal.      Ears:      Comments: Patient is concerned about ear infection and pressure in his ears causing him dizziness.  The canals are clear and the TMs are slightly retracted with some air-fluid levels.  No otitis media is noted.  He does possibly have some eustachian tube dysfunction.  Patient will try some Tylenol cold and sinus for relief.  There is no cerumen impaction.  He also can take some over-the-counter allergy pills like we discussed.     Nose: Rhinorrhea present.      Comments: Patient does have seasonal rhinorrhea with the weather change.  He occasionally takes Mucinex or over-the-counter allergy pills.  He will try some Tylenol cold and sinus.     Mouth/Throat:      Mouth: Mucous membranes are moist.      Pharynx: Oropharynx is clear.      Comments: Mouth is moist, tongue is midline.  No  posterior pharyngeal erythema.  Eyes:      Extraocular Movements: Extraocular movements intact.      Conjunctiva/sclera: Conjunctivae normal.      Pupils: Pupils are equal, round, and reactive to light.      Comments: No visual disturbance, does have his  eyes examined once a year.   Neck:      Comments: Restricted range of motion cervical spine due to degenerative arthritis and secondary spasm.  He did previously have an old neck and right shoulder injury.  No carotid bruits, no thyromegaly, no cervical adenopathy.  Occasional neck spasm and restriction of motion secondary to mild arthritis, stress and tension.  Cardiovascular:      Rate and Rhythm: Normal rate and regular rhythm.      Pulses: Normal pulses.      Heart sounds: Normal heart sounds.      Comments: Patient denies chest pain and no palpitations.  Heart rhythm is stable S1 and S2 are noted, no ectopics.  Pulmonary:      Effort: Pulmonary effort is normal.      Breath sounds: Normal breath sounds.      Comments: Patient denies any coughing or wheezing.  Lungs are clear to auscultation.    Abdominal:      General: Bowel sounds are normal.      Palpations: Abdomen is soft.      Comments: Ulcerative colitis and had rectal bleeding.  Patient does follow with gastroenterology for ulcerative colitis.  He also has some Rowasa enemas that he uses and he does take probiotics.  Abdomen is currently nontender and I will check his CBC today.  He plans on calling his gastroenterologist if he does not improve.   Genitourinary:     Comments: Follows with Urology for low testosterone.  Musculoskeletal:         General: Tenderness (Left knee tenderness and will do quadricep strengthening.) present. Normal range of motion.      Cervical back: Normal range of motion. Tenderness present.      Comments: Patient does have some DJD and pain in his left knee.  He will try to do some quadricep strengthening and straight leg raises.  Overall he is quite healthy and does go to the  gym to workout several times per week.  Age-related arthritis in the joints but otherwise stable.   Skin:     General: Skin is warm.      Comments: There is no bruising, no erythema, no skin lesions noted, no rashes.   Neurological:      General: No focal deficit present.      Mental Status: He is alert and oriented to person, place, and time. Mental status is at baseline.      Comments: No focal neurosensory deficits are noted.  Patient denies any peripheral neuropathy.  Coordination and gait are stable.  Normal muscle strength upper and lower extremities.   Psychiatric:         Mood and Affect: Mood normal.         Behavior: Behavior normal.         Thought Content: Thought content normal.         Judgment: Judgment normal.      Comments: Patient has normal mood and affect.  He does have some concerns about his ulcerative colitis and does not like when it flares up periodically.  Thought content and judgment are stable.  No signs of vascular dementia.  Behavior is normal.     PLAN : Patient is a relatively healthy 63-year-old male who has a history of ulcerative colitis and has been having some dizzy spells and ear congestion.  I do think it is from weather change and he will try some Tylenol cold and sinus.  He can also take some over-the-counter allergy pills which should help.  There is no infection in the inner ears so he does not need antibiotics.  I am also checking some blood work to make sure his hemoglobin and white blood cell count are stable.  He also will be notified of his lipids and blood counts in a few days.  He otherwise is stable and will follow-up as needed pending the blood work results.    Problem List Items Addressed This Visit       Dyslipidemia    Relevant Orders    CBC    Comprehensive Metabolic Panel    Lipid Panel    Thyroid Stimulating Hormone    Labile hypertension - Primary    Low vitamin D level    Relevant Orders    Vitamin D 25-Hydroxy,Total (for eval of Vitamin D levels)    HTN  (hypertension)    Relevant Orders    CBC    Comprehensive Metabolic Panel    Lipid Panel    Thyroid Stimulating Hormone     Other Visit Diagnoses       Screening PSA (prostate specific antigen)        Relevant Orders    Prostate Specific Antigen, Screen    Malaise and fatigue        Relevant Orders    CBC    Comprehensive Metabolic Panel    Lipid Panel    Thyroid Stimulating Hormone                 Immanuel Aleman DO

## 2024-04-19 DIAGNOSIS — K51.811 OTHER ULCERATIVE COLITIS WITH RECTAL BLEEDING (MULTI): Primary | ICD-10-CM

## 2024-04-19 RX ORDER — PREDNISONE 10 MG/1
TABLET ORAL
Qty: 28 TABLET | Refills: 0 | Status: SHIPPED | OUTPATIENT
Start: 2024-04-19 | End: 2024-04-29 | Stop reason: ALTCHOICE

## 2024-04-19 NOTE — RESULT ENCOUNTER NOTE
I spoke with patient so there is no need to call him.                                        Blood sugar was a little low at 64     probably from fasting    kidney and liver function were stable    calcium is a little bit low and he could chew some Tums  daily  and  that will help raise the calcium     protein was a little bit low at 5.8    he can drink a protein milkshake to help   or eat more protein in his diet    white blood cell count is normal      he is more anemic at 10.7 and 6 months ago   he was at   15.2     he should take a vitamin with iron   thyroid function is normal prostate level is normal     vitamin D is normal at 47          cholesterol was good at 135    triglycerides are normal at 71      I did speak with the patient concerning these results.

## 2024-04-20 NOTE — PROGRESS NOTES
"Subjective   Bradley Patterson is a 63 y.o. male.    Chief Complaint:  Follow-up coronary artery disease.    HPI    He has had some noncardiac issues including colitis.  This is resulted in an anemia.  He carries a diagnosis of ulcerative colitis.  Has been followed by a gastroenterologist.  Recently started on some steroid therapy.  From a cardiac standpoint he has done well and has remained asymptomatic.    His diagnosis of coronary artery disease is based on a positive calcium score of 127 which is consistent with the presence of mild to moderate atherosclerotic coronary artery disease. This was done in 2020.     Other medical problems including history of hyperlipidemia. He has a mildly positive family history of heart disease. He has smoked cigars in the past.     Allergies  Medication    · No Known Drug Allergies   Recorded By: Kristi Zuniga; 11/6/2014 11:20:12 AM     Family History  Father    · Family history of hypertension (V17.49) (Z82.49)  Family History    · Family history of cardiac disorder (V17.49) (Z82.49)  Unknown    · Family history of Stomach problems     Social History  Problems    · Does not use illicit drugs (V49.89) (Z78.9)   · Exercises regularly   · Never a smoker   · Occasional alcohol use    Review of Systems   Cardiovascular:  Positive for dyspnea on exertion.   Gastrointestinal:  Positive for abdominal pain.   All other systems reviewed and are negative.      Visit Vitals  /80 (BP Location: Right arm)   Pulse 82   Ht 1.778 m (5' 10\")   Wt 103 kg (226 lb)   SpO2 99%   BMI 32.43 kg/m²   Smoking Status Never   BSA 2.26 m²        Objective     Constitutional:       Appearance: Not in distress.   Neck:      Vascular: JVD normal.   Pulmonary:      Breath sounds: Normal breath sounds.   Cardiovascular:      Normal rate. Regular rhythm. S1 with normal intensity. S2 with normal intensity.       Murmurs: There is no murmur.      No gallop.    Pulses:     Intact distal pulses.   Edema:     " Peripheral edema absent.   Abdominal:      General: Bowel sounds are normal.   Neurological:      Mental Status: Alert and oriented to person, place and time.         Lab Review:   Lab Results   Component Value Date     04/18/2024    K 4.1 04/18/2024     04/18/2024    CO2 23 04/18/2024    BUN 15 04/18/2024    CREATININE 0.86 04/18/2024    GLUCOSE 64 (L) 04/18/2024    CALCIUM 8.5 (L) 04/18/2024       Assessment:    1.  Coronary disease.  Mild by prior CT evaluation.  No anginal symptoms.  Medical management for this patient.    2.  Hypertension.  Blood pressures are normal today.    3.  Hyperlipidemia.  Most recent LDL is 75.    Patient had a prior stress echo study done 2 years ago which showed excellent exercise duration.  He had no ST segment shifts.  Medical studies were normal.

## 2024-04-22 ENCOUNTER — OFFICE VISIT (OUTPATIENT)
Dept: CARDIOLOGY | Facility: CLINIC | Age: 64
End: 2024-04-22
Payer: COMMERCIAL

## 2024-04-22 VITALS
BODY MASS INDEX: 32.35 KG/M2 | OXYGEN SATURATION: 99 % | SYSTOLIC BLOOD PRESSURE: 130 MMHG | DIASTOLIC BLOOD PRESSURE: 80 MMHG | HEART RATE: 82 BPM | WEIGHT: 226 LBS | HEIGHT: 70 IN

## 2024-04-22 DIAGNOSIS — E78.49 OTHER HYPERLIPIDEMIA: ICD-10-CM

## 2024-04-22 DIAGNOSIS — I25.10 CORONARY ARTERY DISEASE INVOLVING NATIVE CORONARY ARTERY OF NATIVE HEART WITHOUT ANGINA PECTORIS: Primary | ICD-10-CM

## 2024-04-22 DIAGNOSIS — I10 PRIMARY HYPERTENSION: ICD-10-CM

## 2024-04-22 DIAGNOSIS — E78.5 HYPERLIPIDEMIA, UNSPECIFIED: ICD-10-CM

## 2024-04-22 DIAGNOSIS — R09.89 LABILE HYPERTENSION: ICD-10-CM

## 2024-04-22 PROBLEM — R03.0 ELEVATED BLOOD PRESSURE READING: Status: RESOLVED | Noted: 2023-02-01 | Resolved: 2024-04-22

## 2024-04-22 PROBLEM — R07.89 CHEST TIGHTNESS: Status: RESOLVED | Noted: 2023-02-01 | Resolved: 2024-04-22

## 2024-04-22 PROCEDURE — 1036F TOBACCO NON-USER: CPT | Performed by: INTERNAL MEDICINE

## 2024-04-22 PROCEDURE — 99213 OFFICE O/P EST LOW 20 MIN: CPT | Performed by: INTERNAL MEDICINE

## 2024-04-22 PROCEDURE — 3079F DIAST BP 80-89 MM HG: CPT | Performed by: INTERNAL MEDICINE

## 2024-04-22 PROCEDURE — 3075F SYST BP GE 130 - 139MM HG: CPT | Performed by: INTERNAL MEDICINE

## 2024-04-22 ASSESSMENT — ENCOUNTER SYMPTOMS
ABDOMINAL PAIN: 1
DYSPNEA ON EXERTION: 1

## 2024-04-23 RX ORDER — VALSARTAN 160 MG/1
160 TABLET ORAL DAILY
Qty: 90 TABLET | Refills: 3 | Status: SHIPPED | OUTPATIENT
Start: 2024-04-23 | End: 2025-04-23

## 2024-04-23 RX ORDER — ROSUVASTATIN CALCIUM 10 MG/1
10 TABLET, COATED ORAL DAILY
Qty: 90 TABLET | Refills: 3 | Status: SHIPPED | OUTPATIENT
Start: 2024-04-23

## 2024-04-29 DIAGNOSIS — K51.811 OTHER ULCERATIVE COLITIS WITH RECTAL BLEEDING (MULTI): Primary | ICD-10-CM

## 2024-04-29 RX ORDER — PREDNISONE 10 MG/1
TABLET ORAL
Qty: 42 TABLET | Refills: 0 | Status: SHIPPED | OUTPATIENT
Start: 2024-04-29

## 2024-11-13 ENCOUNTER — APPOINTMENT (OUTPATIENT)
Dept: PRIMARY CARE | Facility: CLINIC | Age: 64
End: 2024-11-13
Payer: COMMERCIAL

## 2024-11-13 VITALS
RESPIRATION RATE: 16 BRPM | HEART RATE: 84 BPM | TEMPERATURE: 98.2 F | WEIGHT: 225 LBS | BODY MASS INDEX: 44.17 KG/M2 | OXYGEN SATURATION: 96 % | HEIGHT: 60 IN | SYSTOLIC BLOOD PRESSURE: 112 MMHG | DIASTOLIC BLOOD PRESSURE: 72 MMHG

## 2024-11-13 DIAGNOSIS — R25.2 CRAMP AND SPASM: ICD-10-CM

## 2024-11-13 DIAGNOSIS — R53.83 MALAISE AND FATIGUE: ICD-10-CM

## 2024-11-13 DIAGNOSIS — K52.9 COLITIS: ICD-10-CM

## 2024-11-13 DIAGNOSIS — R53.81 MALAISE AND FATIGUE: ICD-10-CM

## 2024-11-13 DIAGNOSIS — E78.49 OTHER HYPERLIPIDEMIA: ICD-10-CM

## 2024-11-13 DIAGNOSIS — K21.00 GASTROESOPHAGEAL REFLUX DISEASE WITH ESOPHAGITIS WITHOUT HEMORRHAGE: ICD-10-CM

## 2024-11-13 DIAGNOSIS — D50.9 IRON DEFICIENCY ANEMIA, UNSPECIFIED IRON DEFICIENCY ANEMIA TYPE: ICD-10-CM

## 2024-11-13 DIAGNOSIS — I10 PRIMARY HYPERTENSION: ICD-10-CM

## 2024-11-13 DIAGNOSIS — R09.89 LABILE HYPERTENSION: Primary | ICD-10-CM

## 2024-11-13 LAB
ALBUMIN SERPL BCP-MCNC: 4.6 G/DL (ref 3.4–5)
ALP SERPL-CCNC: 52 U/L (ref 33–136)
ALT SERPL W P-5'-P-CCNC: 21 U/L (ref 10–52)
ANION GAP SERPL CALC-SCNC: 14 MMOL/L (ref 10–20)
AST SERPL W P-5'-P-CCNC: 27 U/L (ref 9–39)
BILIRUB SERPL-MCNC: 0.8 MG/DL (ref 0–1.2)
BUN SERPL-MCNC: 17 MG/DL (ref 6–23)
CALCIUM SERPL-MCNC: 9.6 MG/DL (ref 8.6–10.6)
CHLORIDE SERPL-SCNC: 102 MMOL/L (ref 98–107)
CHOLEST SERPL-MCNC: 189 MG/DL (ref 0–199)
CHOLESTEROL/HDL RATIO: 3.6
CO2 SERPL-SCNC: 29 MMOL/L (ref 21–32)
CREAT SERPL-MCNC: 0.88 MG/DL (ref 0.5–1.3)
EGFRCR SERPLBLD CKD-EPI 2021: >90 ML/MIN/1.73M*2
ERYTHROCYTE [DISTWIDTH] IN BLOOD BY AUTOMATED COUNT: 13 % (ref 11.5–14.5)
GLUCOSE SERPL-MCNC: 83 MG/DL (ref 74–99)
HCT VFR BLD AUTO: 43 % (ref 41–52)
HDLC SERPL-MCNC: 52.3 MG/DL
HGB BLD-MCNC: 14.5 G/DL (ref 13.5–17.5)
LDLC SERPL CALC-MCNC: 113 MG/DL
MCH RBC QN AUTO: 29.5 PG (ref 26–34)
MCHC RBC AUTO-ENTMCNC: 33.7 G/DL (ref 32–36)
MCV RBC AUTO: 87 FL (ref 80–100)
NON HDL CHOLESTEROL: 137 MG/DL (ref 0–149)
NRBC BLD-RTO: 0 /100 WBCS (ref 0–0)
PLATELET # BLD AUTO: 237 X10*3/UL (ref 150–450)
POTASSIUM SERPL-SCNC: 4.7 MMOL/L (ref 3.5–5.3)
PROT SERPL-MCNC: 6.6 G/DL (ref 6.4–8.2)
RBC # BLD AUTO: 4.92 X10*6/UL (ref 4.5–5.9)
SODIUM SERPL-SCNC: 140 MMOL/L (ref 136–145)
TRIGL SERPL-MCNC: 119 MG/DL (ref 0–149)
TSH SERPL-ACNC: 0.9 MIU/L (ref 0.44–3.98)
VLDL: 24 MG/DL (ref 0–40)
WBC # BLD AUTO: 6.4 X10*3/UL (ref 4.4–11.3)

## 2024-11-13 PROCEDURE — 3078F DIAST BP <80 MM HG: CPT | Performed by: FAMILY MEDICINE

## 2024-11-13 PROCEDURE — 85027 COMPLETE CBC AUTOMATED: CPT

## 2024-11-13 PROCEDURE — 84443 ASSAY THYROID STIM HORMONE: CPT

## 2024-11-13 PROCEDURE — 80061 LIPID PANEL: CPT

## 2024-11-13 PROCEDURE — 3074F SYST BP LT 130 MM HG: CPT | Performed by: FAMILY MEDICINE

## 2024-11-13 PROCEDURE — 80053 COMPREHEN METABOLIC PANEL: CPT

## 2024-11-13 PROCEDURE — 99214 OFFICE O/P EST MOD 30 MIN: CPT | Performed by: FAMILY MEDICINE

## 2024-11-13 PROCEDURE — 1036F TOBACCO NON-USER: CPT | Performed by: FAMILY MEDICINE

## 2024-11-13 PROCEDURE — 3008F BODY MASS INDEX DOCD: CPT | Performed by: FAMILY MEDICINE

## 2024-11-13 ASSESSMENT — PATIENT HEALTH QUESTIONNAIRE - PHQ9
SUM OF ALL RESPONSES TO PHQ9 QUESTIONS 1 AND 2: 0
2. FEELING DOWN, DEPRESSED OR HOPELESS: NOT AT ALL
1. LITTLE INTEREST OR PLEASURE IN DOING THINGS: NOT AT ALL

## 2024-11-13 ASSESSMENT — PAIN SCALES - GENERAL: PAINLEVEL_OUTOF10: 0-NO PAIN

## 2024-11-13 NOTE — PROGRESS NOTES
"Suzanne Patterson \"Robbin\" is a 64 y.o. male who presents for Follow-up (Follow up visit, patient complains of lightheadedness, fatigue, and congestion).    HPI  : Patient is a 64-year-old male who is in for a follow-up visit and recheck on blood pressure, blood work and review of medication.  He does follow with a gastroenterologist at the [facility] for his colitis and he had been put on budesonide 3 mg twice a day for his active colitis.  Everything has seemed to improve and he has stopped the medication at this time.  Patient does also follow with urology and has been getting testosterone injections usually on a monthly basis.  He is also still taking his blood pressure medication and his statin for dyslipidemia.    Objective  ; ROS  : 10 systems were reviewed and the information is included in the HPI and no additional review of systems is indicated.    Physical Exam  Vitals and nursing note reviewed.   Constitutional:       Appearance: Normal appearance. He is obese.      Comments: Patient is alert and oriented x3.   No acute distress and trying to watch his diet and lose some weight.   HENT:      Head: Normocephalic.      Right Ear: Tympanic membrane and external ear normal.      Left Ear: Tympanic membrane and external ear normal.      Ears:      Comments: Ears are patent bilaterally and TMs are clear.     Nose: Nose normal.      Mouth/Throat:      Mouth: Mucous membranes are moist.      Pharynx: Oropharynx is clear.      Comments: Mouth is moist, tongue is midline.  No posterior pharyngeal erythema.  Eyes:      Extraocular Movements: Extraocular movements intact.      Conjunctiva/sclera: Conjunctivae normal.      Pupils: Pupils are equal, round, and reactive to light.      Comments: No visual disturbance, does have his  eyes examined once a year.   Neck:      Comments: No carotid bruits, no thyromegaly, no cervical adenopathy.  Occasional neck spasm and restriction of motion secondary to " stress and tension.  Cardiovascular:      Rate and Rhythm: Normal rate and regular rhythm.      Pulses: Normal pulses.      Heart sounds: Normal heart sounds.      Comments: Patient denies chest pain and no palpitations.  Heart rhythm is stable S1 and S2 are noted, no ectopics.  Pulmonary:      Effort: Pulmonary effort is normal.      Comments: Patient has mild cough with weather change.   Lungs with  few   rhonchi.  Abdominal:      General: Bowel sounds are normal.      Palpations: Abdomen is soft.      Comments: Abdomen is soft and mildly obese. Long history of Colitis and follows with gastroenterology at the [facility].  He recently has been stable and his anemia has improved.  We will recheck it today.   No flank tenderness.  No suprapubic pain.    No abdominal guarding and no rebound tenderness.   Genitourinary:     Comments: Follows with Urology  and is on Testosterone.    Musculoskeletal:         General: Normal range of motion.      Cervical back: Normal range of motion.      Comments: Age-related arthritis in the joints.  Mild restriction of motion cervical and lumbar spines due to muscle spasm.   Skin:     General: Skin is warm and dry.      Comments: There is no bruising, no erythema, no skin lesions noted, no rashes.   Neurological:      General: No focal deficit present.      Mental Status: He is alert and oriented to person, place, and time. Mental status is at baseline.      Comments: No focal neurosensory deficits are noted.  Patient denies any peripheral neuropathy.  Coordination and gait are stable.  Normal muscle strength upper and lower extremities.   Psychiatric:         Mood and Affect: Mood normal.         Behavior: Behavior normal.         Thought Content: Thought content normal.         Judgment: Judgment normal.      Comments: Patient has normal mood and affect.  Thought content and judgment are stable.  No signs of vascular dementia.  Behavior is normal.     PLAN : Patient is a  64-year-old male who was evaluated today for a follow-up visit and recheck on blood pressure, lipids, and blood counts since he was previously anemic.  He has a long history of colitis and was recently started on budesonide 3 mg twice daily and he feels like everything is stable.  Blood pressure and other vitals were stable.  He had his recent flu shot and he does not want a COVID-vaccine.  Patient still follows with urology and does not need his colonoscopy until next year.  He will be notified of all his blood results in 3 days and further recommendations will be made at that time.  Patient will follow-up as needed pending the blood work results.    Problem List Items Addressed This Visit       Hyperlipidemia    Relevant Orders    CBC    Comprehensive Metabolic Panel    Lipid Panel    Thyroid Stimulating Hormone    HTN (hypertension)    Relevant Orders    CBC    Comprehensive Metabolic Panel    Lipid Panel    Thyroid Stimulating Hormone    Malaise and fatigue    Relevant Orders    CBC    Comprehensive Metabolic Panel    Lipid Panel    Thyroid Stimulating Hormone            Immanuel Aleman, DO

## 2024-11-14 NOTE — RESULT ENCOUNTER NOTE
Cholesterol is normal at 189      triglycerides are normal at 119        blood sugar, kidney and liver function are normal         thyroid function is normal and red and white blood cell counts are normal.         No signs of anemia         keep up the good work everything looks stable
Statement Selected

## 2024-12-04 DIAGNOSIS — J01.10 ACUTE NON-RECURRENT FRONTAL SINUSITIS: ICD-10-CM

## 2024-12-04 RX ORDER — AZITHROMYCIN 250 MG/1
TABLET, FILM COATED ORAL
Qty: 6 TABLET | Refills: 0 | Status: SHIPPED | OUTPATIENT
Start: 2024-12-04 | End: 2024-12-09

## 2024-12-24 ENCOUNTER — APPOINTMENT (OUTPATIENT)
Dept: PRIMARY CARE | Facility: CLINIC | Age: 64
End: 2024-12-24
Payer: COMMERCIAL

## 2025-01-14 ENCOUNTER — APPOINTMENT (OUTPATIENT)
Dept: PRIMARY CARE | Facility: CLINIC | Age: 65
End: 2025-01-14
Payer: COMMERCIAL

## 2025-01-14 VITALS
WEIGHT: 230 LBS | TEMPERATURE: 97.9 F | HEART RATE: 67 BPM | BODY MASS INDEX: 32.93 KG/M2 | HEIGHT: 70 IN | RESPIRATION RATE: 16 BRPM | DIASTOLIC BLOOD PRESSURE: 90 MMHG | SYSTOLIC BLOOD PRESSURE: 144 MMHG | OXYGEN SATURATION: 96 %

## 2025-01-14 DIAGNOSIS — K51.811 OTHER ULCERATIVE COLITIS WITH RECTAL BLEEDING: ICD-10-CM

## 2025-01-14 DIAGNOSIS — J01.00 ACUTE NON-RECURRENT MAXILLARY SINUSITIS: ICD-10-CM

## 2025-01-14 DIAGNOSIS — J20.9 ACUTE BRONCHITIS, UNSPECIFIED ORGANISM: Primary | ICD-10-CM

## 2025-01-14 DIAGNOSIS — R53.83 FATIGUE, UNSPECIFIED TYPE: ICD-10-CM

## 2025-01-14 DIAGNOSIS — E34.9 TESTOSTERONE DEFICIENCY: ICD-10-CM

## 2025-01-14 PROCEDURE — 3008F BODY MASS INDEX DOCD: CPT | Performed by: FAMILY MEDICINE

## 2025-01-14 PROCEDURE — 3077F SYST BP >= 140 MM HG: CPT | Performed by: FAMILY MEDICINE

## 2025-01-14 PROCEDURE — 99214 OFFICE O/P EST MOD 30 MIN: CPT | Performed by: FAMILY MEDICINE

## 2025-01-14 PROCEDURE — 1036F TOBACCO NON-USER: CPT | Performed by: FAMILY MEDICINE

## 2025-01-14 PROCEDURE — 3080F DIAST BP >= 90 MM HG: CPT | Performed by: FAMILY MEDICINE

## 2025-01-14 RX ORDER — DOXYCYCLINE 100 MG/1
100 CAPSULE ORAL 2 TIMES DAILY
Qty: 14 CAPSULE | Refills: 0 | Status: SHIPPED | OUTPATIENT
Start: 2025-01-14 | End: 2025-01-21

## 2025-01-14 RX ORDER — BROMPHENIRAMINE MALEATE, PSEUDOEPHEDRINE HYDROCHLORIDE, AND DEXTROMETHORPHAN HYDROBROMIDE 2; 30; 10 MG/5ML; MG/5ML; MG/5ML
5 SYRUP ORAL 3 TIMES DAILY PRN
Qty: 120 ML | Refills: 1 | Status: SHIPPED | OUTPATIENT
Start: 2025-01-14 | End: 2025-01-24

## 2025-01-14 ASSESSMENT — PATIENT HEALTH QUESTIONNAIRE - PHQ9
1. LITTLE INTEREST OR PLEASURE IN DOING THINGS: NOT AT ALL
2. FEELING DOWN, DEPRESSED OR HOPELESS: NOT AT ALL
SUM OF ALL RESPONSES TO PHQ9 QUESTIONS 1 AND 2: 0

## 2025-01-14 ASSESSMENT — PAIN SCALES - GENERAL: PAINLEVEL_OUTOF10: 0-NO PAIN

## 2025-01-14 NOTE — PROGRESS NOTES
"Suzanne Patterson \"Robbin\" is a 64 y.o. male who presents for Follow-up (Follow up visit, blood pressure check and medication review today).    HPI  : Patient is a 64-year-old male who states he has been coughing and congested since Mg.  I had prescribed him a Z-Joao 2 weeks ago and he states it did help but he still has cough with congestion and feels like he is getting bronchitis.  Also his mother was sick and she had pneumonia and apparently had been to the emergency room.  Patient will be evaluated today and his blood pressure was slightly elevated since he was not feeling up to normal.    Objective  : ROS : 10 systems were reviewed and the information is included in the HPI and no additional review of systems is indicated.    Physical Exam  Vitals and nursing note reviewed.   Constitutional:       Appearance: Normal appearance. He is obese.      Comments: Patient is alert and oriented x3.   No acute distress   HENT:      Head: Normocephalic.      Right Ear: Tympanic membrane and external ear normal.      Left Ear: Tympanic membrane and external ear normal.      Ears:      Comments: Ears are patent bilaterally and TMs are clear.     Nose: Congestion present.      Comments: Mild sinus congestion,   posterior nasal cavities.     Mouth/Throat:      Mouth: Mucous membranes are moist.      Pharynx: Oropharynx is clear.      Comments: Mouth is moist, tongue is midline.  No posterior pharyngeal erythema.  Eyes:      Extraocular Movements: Extraocular movements intact.      Conjunctiva/sclera: Conjunctivae normal.      Pupils: Pupils are equal, round, and reactive to light.      Comments: No visual disturbance, does have his  eyes examined once a year.  Mild conjunctival irritation from sinus congestion.   Neck:      Comments: No carotid bruits, no thyromegaly, no cervical adenopathy.  Occasional neck spasm and restriction of motion secondary to stress and tension.  Cardiovascular:      Rate and Rhythm: " Normal rate and regular rhythm.      Pulses: Normal pulses.      Heart sounds: Normal heart sounds.      Comments: Patient denies chest pain and no palpitations.  Heart rhythm is stable S1 and S2 are noted, no ectopics.  Pulmonary:      Effort: Pulmonary effort is normal.      Breath sounds: Rhonchi present.      Comments: Patient has scattered bilateral loose rhonchi to auscultation consistent with bronchitis.  No wheezing or shortness of breath noted.  Abdominal:      General: Bowel sounds are normal.      Palpations: Abdomen is soft.      Comments: Patient does follow with gastroenterology for ulcerative colitis and he is currently taking budesonide.  No flank tenderness.  No suprapubic pain.     Genitourinary:     Comments: Patient follows with urology for low testosterone.  Musculoskeletal:         General: Normal range of motion.      Cervical back: Normal range of motion.      Comments: Age-related arthritis in the joints.  Mild restriction of motion cervical and lumbar spines due to muscle spasm.   Skin:     General: Skin is warm and dry.      Comments: There is no bruising, no erythema, no skin lesions noted, no rashes.   Neurological:      General: No focal deficit present.      Mental Status: He is alert and oriented to person, place, and time. Mental status is at baseline.      Comments: No focal neurosensory deficits are noted.  Patient denies any peripheral neuropathy.  Coordination and gait are stable.  Normal muscle strength upper and lower extremities.   Psychiatric:         Mood and Affect: Mood normal.         Behavior: Behavior normal.         Thought Content: Thought content normal.         Judgment: Judgment normal.      Comments: Patient has normal mood and affect.  Thought content and judgment are stable.  No signs of vascular dementia.  Behavior is normal.     PLAN : Patient is a 64-year-old male who was evaluated today for recurrent coughing and congestion.  He did have acute sinusitis and  acute bronchitis and will be started on Vibramycin 100 mg twice daily.  Also was given some Bromfed-DM cough syrup to take as needed.  Patient is also on budesonide from gastroenterology for his ulcerative colitis.  He will follow-up in a few weeks to have his blood pressure rechecked and then may need to do blood work at that time.    Problem List Items Addressed This Visit    None           Immanuel Aleman, DO

## 2025-02-04 DIAGNOSIS — J40 BRONCHITIS: ICD-10-CM

## 2025-02-05 RX ORDER — DOXYCYCLINE 100 MG/1
100 CAPSULE ORAL 2 TIMES DAILY
Qty: 20 CAPSULE | Refills: 0 | Status: SHIPPED | OUTPATIENT
Start: 2025-02-05 | End: 2025-02-15

## 2025-03-24 ENCOUNTER — OFFICE VISIT (OUTPATIENT)
Dept: PRIMARY CARE | Facility: CLINIC | Age: 65
End: 2025-03-24
Payer: COMMERCIAL

## 2025-03-24 VITALS
TEMPERATURE: 97 F | DIASTOLIC BLOOD PRESSURE: 77 MMHG | HEIGHT: 70 IN | WEIGHT: 219 LBS | HEART RATE: 76 BPM | OXYGEN SATURATION: 97 % | BODY MASS INDEX: 31.35 KG/M2 | SYSTOLIC BLOOD PRESSURE: 140 MMHG | RESPIRATION RATE: 16 BRPM

## 2025-03-24 DIAGNOSIS — Z12.5 ENCOUNTER FOR SCREENING PROSTATE SPECIFIC ANTIGEN (PSA) MEASUREMENT: ICD-10-CM

## 2025-03-24 DIAGNOSIS — Z00.00 PHYSICAL EXAM, ANNUAL: Primary | ICD-10-CM

## 2025-03-24 DIAGNOSIS — E55.9 VITAMIN D DEFICIENCY: ICD-10-CM

## 2025-03-24 DIAGNOSIS — J01.00 ACUTE NON-RECURRENT MAXILLARY SINUSITIS: ICD-10-CM

## 2025-03-24 DIAGNOSIS — J18.0 BRONCHIAL PNEUMONIA: ICD-10-CM

## 2025-03-24 LAB
APPEARANCE UR: CLEAR
BILIRUB UR QL STRIP: NEGATIVE
COLOR UR: YELLOW
GLUCOSE UR STRIP-MCNC: NEGATIVE MG/DL
HGB UR QL STRIP: NEGATIVE
KETONES UR STRIP-MCNC: NEGATIVE MG/DL
LEUKOCYTE ESTERASE UR QL STRIP: NEGATIVE
NITRITE UR QL STRIP: NEGATIVE
PH UR STRIP: 6 [PH]
PROT UR STRIP-MCNC: NEGATIVE MG/DL
SP GR UR STRIP.AUTO: 1.01
UROBILINOGEN UR STRIP-ACNC: 0.2 E.U./DL

## 2025-03-24 PROCEDURE — 3008F BODY MASS INDEX DOCD: CPT | Performed by: FAMILY MEDICINE

## 2025-03-24 PROCEDURE — 81003 URINALYSIS AUTO W/O SCOPE: CPT | Performed by: FAMILY MEDICINE

## 2025-03-24 PROCEDURE — 3077F SYST BP >= 140 MM HG: CPT | Performed by: FAMILY MEDICINE

## 2025-03-24 PROCEDURE — 3078F DIAST BP <80 MM HG: CPT | Performed by: FAMILY MEDICINE

## 2025-03-24 PROCEDURE — 99396 PREV VISIT EST AGE 40-64: CPT | Performed by: FAMILY MEDICINE

## 2025-03-24 PROCEDURE — 1036F TOBACCO NON-USER: CPT | Performed by: FAMILY MEDICINE

## 2025-03-24 PROCEDURE — 93000 ELECTROCARDIOGRAM COMPLETE: CPT | Performed by: FAMILY MEDICINE

## 2025-03-24 RX ORDER — PREDNISONE 10 MG/1
10 TABLET ORAL 2 TIMES DAILY
Qty: 10 TABLET | Refills: 0 | Status: SHIPPED | OUTPATIENT
Start: 2025-03-24 | End: 2025-03-29

## 2025-03-24 ASSESSMENT — PAIN SCALES - GENERAL: PAINLEVEL_OUTOF10: 0-NO PAIN

## 2025-03-24 NOTE — PROGRESS NOTES
"Suzanne Patterson \"Robbin\" is a 64 y.o. male who presents for Annual Exam (Patient here for annual physical exam today)    HPI  :   Patient  is a 64-year-old male who is in for his yearly physical exam.  He will have an ECG, give us a urine specimen and also complete blood work.  He states that about 4 days ago he was at an urgent care center because he was coughing and congested and had trouble catching his breath.  He was told that he had a left lower lobe pneumonia and treated with doxycycline, short course of antibiotics, and an inhaler.  He will be reevaluated today as well as having his physical exam.  He does seem to catch bronchitis quite easily and with the colder than normal weather he states he has been coughing for several weeks.  Patient states he is feeling better with the cough and congestion this week than he was last week.  If he does not improve he will need a chest x-ray.    Objective   : ROS : 10 systems were reviewed and the information is included in the HPI and no additional review of systems is indicated.    Physical Exam  Vitals and nursing note reviewed.   Constitutional:       Appearance: Normal appearance.      Comments: Patient is alert and oriented x3.   No acute distress but patient does have intermittent coughing and is wearing a mask.   HENT:      Head: Normocephalic.      Right Ear: Tympanic membrane and external ear normal.      Left Ear: Tympanic membrane and external ear normal.      Ears:      Comments: Ears are patent bilaterally and TMs are clear.     Nose: Congestion and rhinorrhea present.      Comments: Nasal congestion and post nasal drainage which aggravates his coughing.     Mouth/Throat:      Mouth: Mucous membranes are moist.      Pharynx: Oropharynx is clear.      Comments: Mouth is moist, tongue is midline.  No posterior pharyngeal erythema.  Eyes:      Extraocular Movements: Extraocular movements intact.      Conjunctiva/sclera: Conjunctivae normal.      " Pupils: Pupils are equal, round, and reactive to light.      Comments: No visual disturbance, does have his  eyes examined once a year.   Neck:      Comments: No carotid bruits, no thyromegaly, no cervical adenopathy.  Mild restriction of motion cervical spine due to degenerative disc disease and secondary spasm.  Cardiovascular:      Rate and Rhythm: Normal rate and regular rhythm.      Pulses: Normal pulses.      Heart sounds: Normal heart sounds.      Comments: Patient denies chest pain and no palpitations.  Heart rhythm is stable S1 and S2 are noted, no ectopics.  Pulmonary:      Effort: Pulmonary effort is normal.      Breath sounds: Rhonchi present.      Comments: Patient was diagnosed with left lower lobe pneumonia last week and is still coughing and congested.  He does have scattered left lower lobe rhonchi to auscultation.  He is still taking antibiotics and I will give him 5 more days of 10 mg prednisone.   Pulse ox 97%.   Abdominal:      General: Bowel sounds are normal.      Palpations: Abdomen is soft.      Comments: Abdomen is soft and mildly obese but nontender.  No flank tenderness.  No suprapubic pain.  No abdominal guarding and no rebound tenderness.   Genitourinary:     Comments: Patient denies dysuria, no hematuria, no nocturia, denies flank pain.  Follows with Urology.   On Testosterone injections.  Musculoskeletal:         General: Tenderness present.      Cervical back: Tenderness present.      Comments: Age-related arthritis in the joints. Stiffness mainly in the morning with his neck and back.   Restriction of motion cervical and lumbar spines due to  arthritis.     Skin:     General: Skin is warm.      Comments: There is no bruising, no erythema, no skin lesions noted, no rashes.   Neurological:      General: No focal deficit present.      Mental Status: He is alert and oriented to person, place, and time. Mental status is at baseline.      Comments: No focal neurosensory deficits are  noted.  Patient denies any peripheral neuropathy.  Coordination and gait are stable.  Normal muscle strength upper and lower extremities.   Psychiatric:         Mood and Affect: Mood normal.         Behavior: Behavior normal.         Thought Content: Thought content normal.         Judgment: Judgment normal.      Comments: Patient has normal mood and affect.  Thought content and judgment are stable.  No signs of vascular dementia.  Behavior is normal.     PLAN : Patient is a 64-year-old male who is in today for his yearly physical exam.  ECG showed normal sinus rhythm at a rate of 65 bpm, no acute ST-T wave changes, normal ECG.  Urine shows a pH of 6.0, specific gravity 1.010, negative leukocytes, negative protein, negative blood, negative glucose.  Patient also had his blood work drawn and will be notified of those results in 3 days and further recommendations will be made at that time.  He also had been evaluated last week for a left lower lobe pneumonia at the [facility] and he is still coughing and congested.  He is taking doxycycline 100 mg twice a day for 7 days.  I did give him prednisone 10 mg twice daily for 5 days.  He also will use some Flonase nasal mist, and he has not albuterol inhaler that he can use.  Otherwise he is quite stable except for his frequent allergies and bronchitis.  He may need to have a chest x-ray if he does not completely improve in the next week.  He will follow-up for recheck in 1 week and we will see if his bronchial pneumonia has resolved.    Problem List Items Addressed This Visit       Physical exam, annual    Relevant Orders    CBC    Comprehensive Metabolic Panel    Lipid Panel    Thyroid Stimulating Hormone    POCT UA (Automated) docked device    ECG 12 Lead     Other Visit Diagnoses       Vitamin D deficiency        Relevant Orders    Vitamin D 25-Hydroxy,Total (for eval of Vitamin D levels)    Encounter for screening prostate specific antigen (PSA) measurement         Relevant Orders    Prostate Specific Antigen, Screen                 Immanuel Aleman, DO

## 2025-03-25 LAB
25(OH)D3+25(OH)D2 SERPL-MCNC: 46 NG/ML (ref 30–100)
ALBUMIN SERPL-MCNC: 4.3 G/DL (ref 3.6–5.1)
ALP SERPL-CCNC: 46 U/L (ref 35–144)
ALT SERPL-CCNC: 18 U/L (ref 9–46)
ANION GAP SERPL CALCULATED.4IONS-SCNC: 7 MMOL/L (CALC) (ref 7–17)
AST SERPL-CCNC: 24 U/L (ref 10–35)
BILIRUB SERPL-MCNC: 0.5 MG/DL (ref 0.2–1.2)
BUN SERPL-MCNC: 15 MG/DL (ref 7–25)
CALCIUM SERPL-MCNC: 8.9 MG/DL (ref 8.6–10.3)
CHLORIDE SERPL-SCNC: 105 MMOL/L (ref 98–110)
CHOLEST SERPL-MCNC: 136 MG/DL
CHOLEST/HDLC SERPL: 3.7 (CALC)
CO2 SERPL-SCNC: 29 MMOL/L (ref 20–32)
CREAT SERPL-MCNC: 0.76 MG/DL (ref 0.7–1.35)
EGFRCR SERPLBLD CKD-EPI 2021: 102 ML/MIN/1.73M2
ERYTHROCYTE [DISTWIDTH] IN BLOOD BY AUTOMATED COUNT: 13.4 % (ref 11–15)
GLUCOSE SERPL-MCNC: 129 MG/DL (ref 65–99)
HCT VFR BLD AUTO: 41.1 % (ref 38.5–50)
HDLC SERPL-MCNC: 37 MG/DL
HGB BLD-MCNC: 13.4 G/DL (ref 13.2–17.1)
LDLC SERPL CALC-MCNC: 79 MG/DL (CALC)
MCH RBC QN AUTO: 29.6 PG (ref 27–33)
MCHC RBC AUTO-ENTMCNC: 32.6 G/DL (ref 32–36)
MCV RBC AUTO: 90.9 FL (ref 80–100)
NONHDLC SERPL-MCNC: 99 MG/DL (CALC)
PLATELET # BLD AUTO: 200 THOUSAND/UL (ref 140–400)
PMV BLD REES-ECKER: 9.9 FL (ref 7.5–12.5)
POTASSIUM SERPL-SCNC: 3.7 MMOL/L (ref 3.5–5.3)
PROT SERPL-MCNC: 6.3 G/DL (ref 6.1–8.1)
PSA SERPL-MCNC: 0.48 NG/ML
RBC # BLD AUTO: 4.52 MILLION/UL (ref 4.2–5.8)
SODIUM SERPL-SCNC: 141 MMOL/L (ref 135–146)
TRIGL SERPL-MCNC: 110 MG/DL
TSH SERPL-ACNC: 0.32 MIU/L (ref 0.4–4.5)
WBC # BLD AUTO: 4.7 THOUSAND/UL (ref 3.8–10.8)

## 2025-03-25 NOTE — RESULT ENCOUNTER NOTE
Thyroid function is a little borderline, it has been normal in the past   so I would just wait and recheck that in 4 to 6 months.     Blood sugar was 129 should be under 100    but that could be from the steroids    he has been taking   for his pneumonia   kidney and liver function are normal      cholesterol was normal at 136    triglycerides are normal    at 110       bad cholesterol was normal at 79       vitamin D was normal at 46        prostate level is normal 0.48             Red and white blood cell counts are normal      platelet count is normal             blood work overall is stable      we will just watch the sugar   but I am sure that is from the steroids     and may be rechecked the thyroid in 4 to 6 months.

## 2025-03-31 ENCOUNTER — APPOINTMENT (OUTPATIENT)
Dept: PRIMARY CARE | Facility: CLINIC | Age: 65
End: 2025-03-31
Payer: COMMERCIAL

## 2025-03-31 VITALS
HEIGHT: 70 IN | SYSTOLIC BLOOD PRESSURE: 134 MMHG | WEIGHT: 225 LBS | HEART RATE: 58 BPM | TEMPERATURE: 97.6 F | OXYGEN SATURATION: 97 % | RESPIRATION RATE: 16 BRPM | DIASTOLIC BLOOD PRESSURE: 84 MMHG | BODY MASS INDEX: 32.21 KG/M2

## 2025-03-31 DIAGNOSIS — R05.3 CHRONIC COUGH: ICD-10-CM

## 2025-03-31 DIAGNOSIS — R53.83 MALAISE AND FATIGUE: ICD-10-CM

## 2025-03-31 DIAGNOSIS — E34.9 TESTOSTERONE DEFICIENCY: ICD-10-CM

## 2025-03-31 DIAGNOSIS — K52.9 COLITIS: ICD-10-CM

## 2025-03-31 DIAGNOSIS — J18.0 BRONCHIAL PNEUMONIA: Primary | ICD-10-CM

## 2025-03-31 DIAGNOSIS — R53.81 MALAISE AND FATIGUE: ICD-10-CM

## 2025-03-31 DIAGNOSIS — R09.89 LABILE HYPERTENSION: ICD-10-CM

## 2025-03-31 PROCEDURE — 1036F TOBACCO NON-USER: CPT | Performed by: FAMILY MEDICINE

## 2025-03-31 PROCEDURE — 3075F SYST BP GE 130 - 139MM HG: CPT | Performed by: FAMILY MEDICINE

## 2025-03-31 PROCEDURE — 3079F DIAST BP 80-89 MM HG: CPT | Performed by: FAMILY MEDICINE

## 2025-03-31 PROCEDURE — 3008F BODY MASS INDEX DOCD: CPT | Performed by: FAMILY MEDICINE

## 2025-03-31 PROCEDURE — 99214 OFFICE O/P EST MOD 30 MIN: CPT | Performed by: FAMILY MEDICINE

## 2025-03-31 ASSESSMENT — PAIN SCALES - GENERAL: PAINLEVEL_OUTOF10: 0-NO PAIN

## 2025-03-31 ASSESSMENT — PATIENT HEALTH QUESTIONNAIRE - PHQ9
2. FEELING DOWN, DEPRESSED OR HOPELESS: NOT AT ALL
1. LITTLE INTEREST OR PLEASURE IN DOING THINGS: NOT AT ALL
SUM OF ALL RESPONSES TO PHQ9 QUESTIONS 1 AND 2: 0

## 2025-03-31 NOTE — PROGRESS NOTES
"Suzanne Patterson \"Robbin\" is a 64 y.o. male who presents for Follow-up (Follow up visit after pneumonia/).    HPI  ; patient is a 64-year-old male who is in for a follow-up visit after having pneumonia 2 weeks ago.  He has finished his antibiotics and just has a few more days of steroids left.  He is 98% better but still has a mild cough and does take Mucinex the and he also has an inhaler at home.  He is in for a follow-up visit in case he needs a chest x-ray but he states he would like to wait another week or 2 and make sure he improves before he has the x-ray.  We also reviewed his recent blood results and for some reason his thyroid was off and his blood sugar was up a little bit but these will be rechecked in a few months.  He has never had thyroid problems in the past   and I am sure the blood sugar elevation is from all the steroids and medication that he took for his pneumonia.    Objective  : ROS : 10 systems were reviewed and the information is included in the HPI and no additional review of systems is indicated.    Physical Exam  Vitals and nursing note reviewed.   Constitutional:       Appearance: Normal appearance.      Comments: Patient is alert and oriented x3.   No acute distress   HENT:      Head: Normocephalic.      Right Ear: Tympanic membrane and external ear normal.      Left Ear: Tympanic membrane and external ear normal.      Ears:      Comments: Ears are patent bilaterally and TMs are clear.     Nose: Congestion and rhinorrhea present.      Comments: Moderate sinus congestion from weather change.  He will try some Flonase nasal mist.     Mouth/Throat:      Mouth: Mucous membranes are moist.      Pharynx: Oropharynx is clear.      Comments: Mouth is moist, tongue is midline.  No posterior pharyngeal erythema.  Eyes:      Extraocular Movements: Extraocular movements intact.      Conjunctiva/sclera: Conjunctivae normal.      Pupils: Pupils are equal, round, and reactive to light.      " Comments: No visual disturbance, does have his  eyes examined once a year.   Neck:      Comments: Occasional neck spasm and restriction of motion due to previous history of DJD and secondary spasms.  No carotid bruits, no thyromegaly, no cervical adenopathy.    Cardiovascular:      Rate and Rhythm: Normal rate and regular rhythm.      Pulses: Normal pulses.      Heart sounds: Normal heart sounds.      Comments: Patient denies chest pain and no palpitations.  Heart rhythm is stable S1 and S2 are noted, no ectopics.  Pulmonary:      Effort: Pulmonary effort is normal.      Breath sounds: Rhonchi present.      Comments: Scattered rhonchi  to auscultation.   Bronchitis improving.  Finished antibiotics.  No wheezing is noted.  Patient also gets sinus drainage which aggravates his cough.  Abdominal:      General: Abdomen is flat. Bowel sounds are normal.      Palpations: Abdomen is soft.      Comments: Abdomen is soft and nontender, no hepatosplenomegaly.  No flank tenderness.  No suprapubic pain.  Positive bowel sounds x4.  No abdominal guarding and no rebound tenderness.   Genitourinary:     Comments: Patient denies dysuria, no hematuria, no nocturia, denies flank pain.  Patient follows with urology for low testosterone level.  Musculoskeletal:         General: Normal range of motion.      Cervical back: Tenderness present.      Comments: Age-related arthritis in the joints.  Mild restriction of motion cervical and lumbar spines due to muscle spasm.   Skin:     General: Skin is warm.      Comments: There is no bruising, no erythema, no skin lesions noted, no rashes.   Neurological:      General: No focal deficit present.      Mental Status: He is alert and oriented to person, place, and time. Mental status is at baseline.      Comments: No focal neurosensory deficits are noted.  Patient denies any peripheral neuropathy.  Coordination and gait are stable.  Normal muscle strength upper and lower extremities.    Psychiatric:         Mood and Affect: Mood normal.         Behavior: Behavior normal.         Thought Content: Thought content normal.         Judgment: Judgment normal.      Comments: Patient has normal mood and affect.  Thought content and judgment are stable.  No signs of vascular dementia.  Behavior is normal.     PLAN : Patient is a 64-year-old male who was reevaluated today as a follow-up visit for his pneumonia.  He had been on doxycycline for 10 days and has now completed that dosage.  He is still finishing his prednisone and feels about 98% better.  He does have a albuterol inhaler at home that he will use and he also is taking Mucinex D.  He also will take some probiotics so as not to get diarrhea.  We did review his recent blood results and he will return in a few months to have that rechecked.  His blood sugar was up a little bit from the steroids and his medication and for some reason the thyroid was off but it has never been off in the last 6 years.  Patient otherwise is stable we will follow-up as needed.  We did discuss having a chest x-ray if his cough does not go away in 2 weeks.  He also has a history of ulcerative colitis and has to be very cautious which antibiotics he takes.  Blood pressure and other vitals were stable.    Problem List Items Addressed This Visit    None           Immanuel Aleman,

## 2025-04-02 PROBLEM — J04.0 LARYNGITIS: Status: RESOLVED | Noted: 2023-02-01 | Resolved: 2025-04-02

## 2025-04-02 PROBLEM — R19.7 DIARRHEA: Status: RESOLVED | Noted: 2023-02-01 | Resolved: 2025-04-02

## 2025-04-02 PROBLEM — H69.90 ETD (EUSTACHIAN TUBE DYSFUNCTION): Status: RESOLVED | Noted: 2023-02-01 | Resolved: 2025-04-02

## 2025-04-02 PROBLEM — K52.9 COLITIS: Status: RESOLVED | Noted: 2023-03-15 | Resolved: 2025-04-02

## 2025-04-02 PROBLEM — K51.90 ULCERATIVE COLITIS: Status: RESOLVED | Noted: 2023-02-01 | Resolved: 2025-04-02

## 2025-04-02 PROBLEM — J06.9 URI (UPPER RESPIRATORY INFECTION): Status: RESOLVED | Noted: 2023-02-01 | Resolved: 2025-04-02

## 2025-04-02 PROBLEM — J06.9 ACUTE URI: Status: RESOLVED | Noted: 2023-02-01 | Resolved: 2025-04-02

## 2025-04-02 PROBLEM — J32.9 SINUSITIS: Status: RESOLVED | Noted: 2023-02-01 | Resolved: 2025-04-02

## 2025-04-02 PROBLEM — R09.81 NASAL CONGESTION: Status: RESOLVED | Noted: 2023-02-01 | Resolved: 2025-04-02

## 2025-04-02 PROBLEM — E66.811 CLASS 1 OBESITY WITH BODY MASS INDEX (BMI) OF 32.0 TO 32.9 IN ADULT: Status: ACTIVE | Noted: 2023-02-01

## 2025-04-02 PROBLEM — H65.01 RIGHT ACUTE SEROUS OTITIS MEDIA: Status: RESOLVED | Noted: 2023-02-01 | Resolved: 2025-04-02

## 2025-04-16 ENCOUNTER — APPOINTMENT (OUTPATIENT)
Dept: PRIMARY CARE | Facility: CLINIC | Age: 65
End: 2025-04-16
Payer: COMMERCIAL

## 2025-04-16 VITALS
HEIGHT: 70 IN | TEMPERATURE: 97.9 F | RESPIRATION RATE: 18 BRPM | DIASTOLIC BLOOD PRESSURE: 80 MMHG | SYSTOLIC BLOOD PRESSURE: 122 MMHG | BODY MASS INDEX: 32.07 KG/M2 | OXYGEN SATURATION: 99 % | WEIGHT: 224 LBS | HEART RATE: 73 BPM

## 2025-04-16 DIAGNOSIS — J30.89 NON-SEASONAL ALLERGIC RHINITIS, UNSPECIFIED TRIGGER: ICD-10-CM

## 2025-04-16 DIAGNOSIS — M62.838 MUSCLE SPASMS OF NECK: ICD-10-CM

## 2025-04-16 DIAGNOSIS — J18.0 BRONCHIAL PNEUMONIA: Primary | ICD-10-CM

## 2025-04-16 DIAGNOSIS — Z87.19 HISTORY OF ULCERATIVE COLITIS: ICD-10-CM

## 2025-04-16 DIAGNOSIS — R09.89 LABILE HYPERTENSION: ICD-10-CM

## 2025-04-16 DIAGNOSIS — M54.12 CERVICAL NEURITIS: ICD-10-CM

## 2025-04-16 DIAGNOSIS — R05.8 ALLERGIC COUGH: ICD-10-CM

## 2025-04-16 PROCEDURE — 3008F BODY MASS INDEX DOCD: CPT | Performed by: FAMILY MEDICINE

## 2025-04-16 PROCEDURE — 3074F SYST BP LT 130 MM HG: CPT | Performed by: FAMILY MEDICINE

## 2025-04-16 PROCEDURE — 3079F DIAST BP 80-89 MM HG: CPT | Performed by: FAMILY MEDICINE

## 2025-04-16 PROCEDURE — 1036F TOBACCO NON-USER: CPT | Performed by: FAMILY MEDICINE

## 2025-04-16 PROCEDURE — 99213 OFFICE O/P EST LOW 20 MIN: CPT | Performed by: FAMILY MEDICINE

## 2025-04-16 ASSESSMENT — PAIN SCALES - GENERAL: PAINLEVEL_OUTOF10: 0-NO PAIN

## 2025-04-16 NOTE — PROGRESS NOTES
"Suzanne Patterson \"Robbin\" is a 64 y.o. male who presents for Follow-up (Follow up visit for pneumonia, patient feeling much better today).    HPI  : Patient is a 64-year-old male who had pneumonia several weeks ago and was treated with antibiotics and a albuterol inhaler.  He has improved and is in today for follow-up visit to make sure his lungs are clear and that he does not need any more prednisone or antibiotics.  He states his cough is just about completely gone and he does not have any mucus congestion.  He also has variable labile hypertension and follows with urology for low testosterone.    Objective  : ROS : 10 systems were reviewed and the information is included in the HPI and no additional review of systems is indicated.    Physical Exam  Vitals and nursing note reviewed.   Constitutional:       Appearance: Normal appearance.      Comments: Patient is alert and oriented x3.   No acute distress   HENT:      Head: Normocephalic.      Right Ear: Tympanic membrane and external ear normal.      Left Ear: Tympanic membrane and external ear normal.      Ears:      Comments: Ears are patent bilaterally and TMs are clear.     Nose: Nose normal.      Mouth/Throat:      Mouth: Mucous membranes are moist.      Pharynx: Oropharynx is clear.      Comments: Mouth is moist, tongue is midline.  No posterior pharyngeal erythema.  Eyes:      Extraocular Movements: Extraocular movements intact.      Conjunctiva/sclera: Conjunctivae normal.      Pupils: Pupils are equal, round, and reactive to light.      Comments: No visual disturbance, does have his  eyes examined once a year.   Neck:      Comments: Patient does have recurrent neck pain and spasm and has a history of cervical neuritis.  He occasionally takes Advil and a muscle relaxer as needed.  No carotid bruits, no thyromegaly, no cervical adenopathy.   Cardiovascular:      Rate and Rhythm: Normal rate and regular rhythm.      Pulses: Normal pulses.      " Heart sounds: Normal heart sounds.      Comments: Patient denies chest pain and no palpitations.  Heart rhythm is stable S1 and S2 are noted, no ectopics.  Pulmonary:      Effort: Pulmonary effort is normal.      Breath sounds: Normal breath sounds.      Comments: Patient was treated for pneumonia several weeks ago and his condition has continued to improve.  No coughing or wheezing at this time.  Lungs are clear to auscultation.  Abdominal:      General: Bowel sounds are normal.      Palpations: Abdomen is soft.      Comments: Abdomen is soft and non tender. No flank tenderness.  No suprapubic pain.  Patient does suffer with ulcerative colitis and follows with gastroenterology at the [facility].  His colitis has been stable currently..  No abdominal guarding and no rebound tenderness.   Genitourinary:     Comments: Patient denies dysuria, no hematuria, denies flank pain.  Patient does follow with urology for low testosterone.    Musculoskeletal:         General: Tenderness present. Normal range of motion.      Cervical back: Normal range of motion. Tenderness present.      Comments: Cervical neck spasm and occasional cervical neuritis.  Age-related arthritis in the joints.  Restriction of motion cervical and lumbar spines due to moderate degenerative disc disease and muscle spasm.   Skin:     General: Skin is warm.      Comments: There is no bruising, no erythema, no skin lesions noted, no rashes.   Neurological:      General: No focal deficit present.      Mental Status: He is alert and oriented to person, place, and time. Mental status is at baseline.      Comments: No focal neurosensory deficits are noted.  Patient denies any peripheral neuropathy.  Coordination and gait are stable.  Normal muscle strength upper and lower extremities.   Psychiatric:         Mood and Affect: Mood normal.         Behavior: Behavior normal.         Thought Content: Thought content normal.         Judgment: Judgment normal.       Comments: Patient has normal mood and affect.  Thought content and judgment are stable.  No signs of vascular dementia.  Behavior is normal.     PLAN :   Patient is a 64-year-old male who is in for a follow-up visit on his resolving pneumonia.  He was treated with several weeks of antibiotics and prednisone and his condition has improved.  He no longer has any coughing or wheezing.      Lungs are clear to auscultation.  His energy level is improving and he states he is almost back to normal.  Patient will follow-up in 4 months or sooner as needed.        Problem List Items Addressed This Visit    None           Immanuel Aleman, DO

## 2025-04-23 ENCOUNTER — APPOINTMENT (OUTPATIENT)
Dept: CARDIOLOGY | Facility: CLINIC | Age: 65
End: 2025-04-23
Payer: COMMERCIAL

## 2025-04-23 VITALS
SYSTOLIC BLOOD PRESSURE: 128 MMHG | HEART RATE: 70 BPM | BODY MASS INDEX: 31.5 KG/M2 | WEIGHT: 220 LBS | OXYGEN SATURATION: 99 % | DIASTOLIC BLOOD PRESSURE: 76 MMHG | HEIGHT: 70 IN

## 2025-04-23 DIAGNOSIS — I25.10 CORONARY ARTERY DISEASE INVOLVING NATIVE CORONARY ARTERY OF NATIVE HEART WITHOUT ANGINA PECTORIS: ICD-10-CM

## 2025-04-23 DIAGNOSIS — I10 PRIMARY HYPERTENSION: ICD-10-CM

## 2025-04-23 DIAGNOSIS — R09.89 LABILE HYPERTENSION: ICD-10-CM

## 2025-04-23 DIAGNOSIS — E78.49 OTHER HYPERLIPIDEMIA: Primary | ICD-10-CM

## 2025-04-23 DIAGNOSIS — E78.5 HYPERLIPIDEMIA, UNSPECIFIED: ICD-10-CM

## 2025-04-23 PROCEDURE — 3074F SYST BP LT 130 MM HG: CPT | Performed by: INTERNAL MEDICINE

## 2025-04-23 PROCEDURE — 3078F DIAST BP <80 MM HG: CPT | Performed by: INTERNAL MEDICINE

## 2025-04-23 PROCEDURE — 1036F TOBACCO NON-USER: CPT | Performed by: INTERNAL MEDICINE

## 2025-04-23 PROCEDURE — 99213 OFFICE O/P EST LOW 20 MIN: CPT | Performed by: INTERNAL MEDICINE

## 2025-04-23 PROCEDURE — 3008F BODY MASS INDEX DOCD: CPT | Performed by: INTERNAL MEDICINE

## 2025-04-23 RX ORDER — VALSARTAN 160 MG/1
160 TABLET ORAL DAILY
Qty: 90 TABLET | Refills: 3 | Status: SHIPPED | OUTPATIENT
Start: 2025-04-23 | End: 2026-04-23

## 2025-04-23 RX ORDER — ROSUVASTATIN CALCIUM 10 MG/1
10 TABLET, COATED ORAL DAILY
Qty: 90 TABLET | Refills: 3 | Status: SHIPPED | OUTPATIENT
Start: 2025-04-23

## 2025-04-23 NOTE — PROGRESS NOTES
"Suzanne Patterson \"Robbin\" is a 64 y.o. male.    Chief Complaint:  Follow-up coronary artery disease.    HPI    Has had no cardiac symptoms.  No anginal symptoms.  Exercises on a regular basis and remains asymptomatic.  Has had a history of colitis.  Also recently treated for pneumonia.    His diagnosis of coronary artery disease is based on a positive calcium score of 127 which is consistent with the presence of mild to moderate atherosclerotic coronary artery disease. This was done in 2020.     Other medical problems including history of hyperlipidemia. He has a mildly positive family history of heart disease. He has smoked cigars in the past.      Allergies  Medication    · No Known Drug Allergies     Family History  Father    · Family history of hypertension (V17.49) (Z82.49)     Social History  Problems    · Does not use illicit drugs (V49.89) (Z78.9)   · Exercises regularly   · Never a smoker   · Occasional alcohol use    Review of Systems   All other systems reviewed and are negative.      Current Medications[1]     Visit Vitals  /76 (BP Location: Right arm)   Pulse 70   Ht 1.778 m (5' 10\")   Wt 99.8 kg (220 lb)   SpO2 99%   BMI 31.57 kg/m²   Smoking Status Never   BSA 2.22 m²        Objective     Constitutional:       Appearance: Not in distress.   Neck:      Vascular: JVD normal.   Pulmonary:      Breath sounds: Normal breath sounds.   Cardiovascular:      Normal rate. Regular rhythm. S1 with normal intensity. S2 with normal intensity.       Murmurs: There is no murmur.      No gallop.    Pulses:     Intact distal pulses.   Edema:     Peripheral edema absent.   Abdominal:      General: Bowel sounds are normal.   Neurological:      Mental Status: Alert and oriented to person, place and time.         Lab Review:   Lab Results   Component Value Date     03/24/2025    K 3.7 03/24/2025     03/24/2025    CO2 29 03/24/2025    BUN 15 03/24/2025    CREATININE 0.76 03/24/2025    GLUCOSE 129 " (H) 03/24/2025    CALCIUM 8.9 03/24/2025     Lab Results   Component Value Date    CHOL 136 03/24/2025    TRIG 110 03/24/2025    HDL 37 (L) 03/24/2025       Assessment:    1.  Coronary disease.  We are going to repeat his CT calcium score.  His last CT scan was done 5 years ago.  We want to determine progression of disease.    2.  Hyperlipidemia.  Cholesterol 136, HDL 37, LDL 79.  If his repeat score is above 200, we will plan to increase the rosuvastatin to 20 mg daily.    3.  Hypertension.  Blood pressures are normal.         [1]   Current Outpatient Medications   Medication Sig Dispense Refill    balsalazide disodium (BALSALAZIDE ORAL) Take by mouth.      chlorzoxazone (Parafon Forte) 500 mg tablet Take 1 tablet (500 mg) by mouth 4 times a day as needed.      testosterone cypionate (Depo-Testosterone) 200 mg/mL injection Inject into the shoulder, thigh, or buttocks.      rosuvastatin (Crestor) 10 mg tablet Take 1 tablet (10 mg) by mouth once daily. 90 tablet 3    valsartan (Diovan) 160 mg tablet Take 1 tablet (160 mg) by mouth once daily. 90 tablet 3     No current facility-administered medications for this visit.

## 2025-04-23 NOTE — PATIENT INSTRUCTIONS
We will schedule you for another CT coronary calcium score. Request the facility in [place].    Call us a week after the test for the results.    If the score is above 200, we will increase your rosuvastatin to 20 mg daily.    If you develop chest pressure or heaviness, call us or go to the Emergency Room

## 2025-06-09 ENCOUNTER — HOSPITAL ENCOUNTER (OUTPATIENT)
Dept: RADIOLOGY | Facility: CLINIC | Age: 65
Discharge: HOME | End: 2025-06-09
Payer: COMMERCIAL

## 2025-06-09 DIAGNOSIS — E78.49 OTHER HYPERLIPIDEMIA: ICD-10-CM

## 2025-06-09 DIAGNOSIS — I10 PRIMARY HYPERTENSION: ICD-10-CM

## 2025-06-09 PROCEDURE — 75571 CT HRT W/O DYE W/CA TEST: CPT

## 2025-06-10 ENCOUNTER — TELEPHONE (OUTPATIENT)
Dept: CARDIOLOGY | Facility: CLINIC | Age: 65
End: 2025-06-10
Payer: COMMERCIAL

## 2025-06-10 NOTE — TELEPHONE ENCOUNTER
----- Message from Mathieu Garnica sent at 6/10/2025  8:16 AM EDT -----  Coronary calcium score is better than 2020.  Doing great.  No change in medications.  ----- Message -----  From: Interface, Radiology Results In  Sent: 6/10/2025   7:40 AM EDT  To: Mathieu Garnica MD

## 2025-09-10 ENCOUNTER — APPOINTMENT (OUTPATIENT)
Dept: PRIMARY CARE | Facility: CLINIC | Age: 65
End: 2025-09-10
Payer: COMMERCIAL